# Patient Record
Sex: MALE | Race: BLACK OR AFRICAN AMERICAN | Employment: UNEMPLOYED | URBAN - METROPOLITAN AREA
[De-identification: names, ages, dates, MRNs, and addresses within clinical notes are randomized per-mention and may not be internally consistent; named-entity substitution may affect disease eponyms.]

---

## 2017-01-01 ENCOUNTER — TELEPHONE (OUTPATIENT)
Dept: NEUROLOGY | Age: 62
End: 2017-01-01

## 2017-01-01 ENCOUNTER — DOCUMENTATION ONLY (OUTPATIENT)
Dept: FAMILY MEDICINE CLINIC | Age: 62
End: 2017-01-01

## 2017-01-01 ENCOUNTER — OFFICE VISIT (OUTPATIENT)
Dept: FAMILY MEDICINE CLINIC | Age: 62
End: 2017-01-01

## 2017-01-01 ENCOUNTER — OFFICE VISIT (OUTPATIENT)
Dept: NEUROLOGY | Age: 62
End: 2017-01-01

## 2017-01-01 ENCOUNTER — HOSPICE ADMISSION (OUTPATIENT)
Dept: HOSPICE | Facility: HOSPICE | Age: 62
End: 2017-01-01

## 2017-01-01 VITALS
BODY MASS INDEX: 28.88 KG/M2 | OXYGEN SATURATION: 98 % | HEART RATE: 71 BPM | WEIGHT: 184 LBS | DIASTOLIC BLOOD PRESSURE: 85 MMHG | SYSTOLIC BLOOD PRESSURE: 123 MMHG | TEMPERATURE: 98.2 F | RESPIRATION RATE: 18 BRPM | HEIGHT: 67 IN

## 2017-01-01 VITALS
RESPIRATION RATE: 18 BRPM | HEIGHT: 67 IN | HEART RATE: 67 BPM | OXYGEN SATURATION: 100 % | DIASTOLIC BLOOD PRESSURE: 76 MMHG | SYSTOLIC BLOOD PRESSURE: 104 MMHG | TEMPERATURE: 97.6 F

## 2017-01-01 VITALS
DIASTOLIC BLOOD PRESSURE: 67 MMHG | RESPIRATION RATE: 18 BRPM | HEIGHT: 67 IN | OXYGEN SATURATION: 96 % | HEART RATE: 54 BPM | SYSTOLIC BLOOD PRESSURE: 106 MMHG

## 2017-01-01 VITALS
SYSTOLIC BLOOD PRESSURE: 118 MMHG | HEART RATE: 73 BPM | RESPIRATION RATE: 16 BRPM | OXYGEN SATURATION: 94 % | DIASTOLIC BLOOD PRESSURE: 74 MMHG

## 2017-01-01 VITALS
SYSTOLIC BLOOD PRESSURE: 110 MMHG | HEIGHT: 67 IN | DIASTOLIC BLOOD PRESSURE: 68 MMHG | HEART RATE: 76 BPM | OXYGEN SATURATION: 94 %

## 2017-01-01 VITALS — HEIGHT: 67 IN | DIASTOLIC BLOOD PRESSURE: 80 MMHG | SYSTOLIC BLOOD PRESSURE: 118 MMHG

## 2017-01-01 DIAGNOSIS — G40.211 PARTIAL SYMPTOMATIC EPILEPSY WITH COMPLEX PARTIAL SEIZURES, INTRACTABLE, WITH STATUS EPILEPTICUS (HCC): ICD-10-CM

## 2017-01-01 DIAGNOSIS — R13.10 DYSPHAGIA, UNSPECIFIED TYPE: ICD-10-CM

## 2017-01-01 DIAGNOSIS — R27.8 ASTERIXIS: ICD-10-CM

## 2017-01-01 DIAGNOSIS — F79 MR (MENTAL RETARDATION): Chronic | ICD-10-CM

## 2017-01-01 DIAGNOSIS — R56.9 CONVULSIONS, UNSPECIFIED CONVULSION TYPE (HCC): Chronic | ICD-10-CM

## 2017-01-01 DIAGNOSIS — Z93.1 STATUS POST INSERTION OF PERCUTANEOUS ENDOSCOPIC GASTROSTOMY (PEG) TUBE (HCC): ICD-10-CM

## 2017-01-01 DIAGNOSIS — R56.9 SEIZURE (HCC): Primary | ICD-10-CM

## 2017-01-01 DIAGNOSIS — G40.019 LOCALIZATION-RELATED IDIOPATHIC EPILEPSY AND EPILEPTIC SYNDROMES WITH SEIZURES OF LOCALIZED ONSET, INTRACTABLE, WITHOUT STATUS EPILEPTICUS (HCC): Primary | ICD-10-CM

## 2017-01-01 DIAGNOSIS — R60.9 EDEMA, UNSPECIFIED TYPE: Primary | ICD-10-CM

## 2017-01-01 DIAGNOSIS — J20.9 ACUTE BRONCHITIS, UNSPECIFIED ORGANISM: Primary | ICD-10-CM

## 2017-01-01 DIAGNOSIS — R04.0 EPISTAXIS: ICD-10-CM

## 2017-01-01 DIAGNOSIS — K59.00 CONSTIPATION, UNSPECIFIED CONSTIPATION TYPE: ICD-10-CM

## 2017-01-01 DIAGNOSIS — R26.2 CANNOT WALK: ICD-10-CM

## 2017-01-01 DIAGNOSIS — R05.9 COUGH: Primary | ICD-10-CM

## 2017-01-01 DIAGNOSIS — R13.10 DYSPHAGIA, UNSPECIFIED TYPE: Primary | ICD-10-CM

## 2017-01-01 LAB
ALBUMIN SERPL-MCNC: 3.4 G/DL (ref 3.6–4.8)
ALBUMIN/GLOB SERPL: 0.7 {RATIO} (ref 1.2–2.2)
ALP SERPL-CCNC: 174 IU/L (ref 39–117)
ALT SERPL-CCNC: 199 IU/L (ref 0–44)
AMMONIA PLAS-MCNC: 98 UG/DL (ref 27–102)
AST SERPL-CCNC: 109 IU/L (ref 0–40)
BILIRUB SERPL-MCNC: <0.2 MG/DL (ref 0–1.2)
BUN SERPL-MCNC: 38 MG/DL (ref 8–27)
BUN/CREAT SERPL: 24 (ref 10–22)
CALCIUM SERPL-MCNC: 9.1 MG/DL (ref 8.6–10.2)
CHLORIDE SERPL-SCNC: 120 MMOL/L (ref 96–106)
CO2 SERPL-SCNC: 26 MMOL/L (ref 18–29)
CREAT SERPL-MCNC: 1.6 MG/DL (ref 0.76–1.27)
GLOBULIN SER CALC-MCNC: 4.8 G/DL (ref 1.5–4.5)
GLUCOSE SERPL-MCNC: 76 MG/DL (ref 65–99)
POTASSIUM SERPL-SCNC: 4 MMOL/L (ref 3.5–5.2)
PROT SERPL-MCNC: 8.2 G/DL (ref 6–8.5)
SODIUM SERPL-SCNC: 159 MMOL/L (ref 134–144)

## 2017-01-01 RX ORDER — AMLODIPINE BESYLATE 10 MG/1
TABLET ORAL DAILY
COMMUNITY

## 2017-01-01 RX ORDER — AZITHROMYCIN 250 MG/1
TABLET, FILM COATED ORAL
Qty: 6 TAB | Refills: 0 | Status: SHIPPED | OUTPATIENT
Start: 2017-01-01 | End: 2017-01-01

## 2017-01-01 RX ORDER — BENZTROPINE MESYLATE 1 MG/1
1 TABLET ORAL
Qty: 30 TAB | Refills: 11 | Status: SHIPPED | OUTPATIENT
Start: 2017-01-01

## 2017-01-01 RX ORDER — MUPIROCIN 20 MG/G
OINTMENT TOPICAL DAILY
COMMUNITY

## 2017-01-01 RX ORDER — BENZTROPINE MESYLATE 1 MG/1
1 TABLET ORAL 2 TIMES DAILY
Qty: 60 TAB | Refills: 5 | Status: SHIPPED | OUTPATIENT
Start: 2017-01-01 | End: 2017-01-01 | Stop reason: SDUPTHER

## 2017-01-01 RX ORDER — METHYLPREDNISOLONE 4 MG/1
TABLET ORAL
Qty: 1 DOSE PACK | Refills: 0 | Status: SHIPPED | OUTPATIENT
Start: 2017-01-01 | End: 2017-01-01

## 2017-01-01 RX ORDER — LEVETIRACETAM 100 MG/ML
10 SOLUTION ORAL 2 TIMES DAILY
COMMUNITY
End: 2017-01-01

## 2017-01-01 RX ORDER — BENZONATATE 200 MG/1
200 CAPSULE ORAL
COMMUNITY

## 2017-01-01 RX ORDER — RISPERIDONE 1 MG/1
0.5 TABLET, FILM COATED ORAL 2 TIMES DAILY
Qty: 60 TAB | Refills: 5 | Status: SHIPPED | OUTPATIENT
Start: 2017-01-01

## 2017-01-01 RX ORDER — BISACODYL 5 MG
5 TABLET, DELAYED RELEASE (ENTERIC COATED) ORAL
Qty: 31 TAB | Refills: 5 | Status: SHIPPED | OUTPATIENT
Start: 2017-01-01

## 2017-01-01 RX ORDER — PSYLLIUM HUSK 3.4 G/7G
POWDER, FOR SOLUTION ORAL
Qty: 368 G | Refills: 0 | Status: SHIPPED | OUTPATIENT
Start: 2017-01-01

## 2017-01-01 RX ORDER — GUAIFENESIN 200 MG/10ML
SOLUTION ORAL
Qty: 240 ML | Refills: 0 | Status: SHIPPED | OUTPATIENT
Start: 2017-01-01

## 2017-01-01 RX ORDER — LORAZEPAM 0.5 MG/1
0.5 TABLET ORAL
Qty: 60 TAB | Refills: 5 | OUTPATIENT
Start: 2017-01-01

## 2017-01-01 RX ORDER — LEVETIRACETAM 1000 MG/1
TABLET ORAL
Qty: 90 TAB | Refills: 5 | Status: SHIPPED | OUTPATIENT
Start: 2017-01-01

## 2017-01-11 NOTE — PATIENT INSTRUCTIONS
Learning About Swallowing Problems  What are swallowing problems? Certain health problems that affect the nervous system can cause trouble swallowing. These conditions include stroke, ALS (also known as Lacie Gehrig's disease), Parkinson's disease, and multiple sclerosis. The muscles and nerves that help move food through the throat and esophagus may not work right. Growths, such as cancer, and other problems with your esophagus can also make it hard to swallow. The esophagus is the tube that leads from your throat to your stomach. How are swallowing problems diagnosed? A doctor or speech therapist will examine you to check for swallowing problems. You may get swallowing tests to check how well your throat muscles work. For these tests, you swallow a special liquid that helps the doctor see your throat and esophagus on an X-ray or video screen. Other tests use a thin, flexible tube called a scope to check for problems with your esophagus. The doctor puts the scope in your mouth and down your throat to look at your esophagus. What are the symptoms? Symptoms of swallowing problems may include:  · Trouble getting food or liquids to go down on the first try. · Gagging, choking, or coughing when you swallow. · Having food or liquids come back up through your throat, mouth, or nose after you swallow. · Feeling like foods or liquids are stuck in some part of your throat or chest.  · Pain when you swallow. How are swallowing problems treated? How swallowing problems are treated depends on the cause. The main goals of treatment will be to help you eat and swallow safely and get good nutrition. This is important for your health and quality of life. You may learn exercises to train your throat muscles to work together so you're able to swallow better. Learning certain ways to put food in your mouth or to position your head while eating may also help.   Your doctor or a speech therapist may recommend changes to your diet to help make it easier to swallow. You may need to avoid certain foods or liquids. You also may need to change the thickness of foods or liquids in your diet. To eat and swallow safely, follow any instructions you get from your doctor or therapist. These ideas may help:  · Sit upright when eating, drinking, and taking pills. · Take small bites of food. Chew completely and swallow before taking another bite. · Take small sips of liquids. Hold the liquid in your mouth as you prepare to swallow. · If eating makes you tired, eat smaller but more frequent meals. · If you cough or choke, lean forward and keep your chin tipped downward while you cough. Where can you learn more? Go to http://daniel-annita.info/. Enter 002 [id number] in the search box to learn more about \"Learning About Swallowing Problems. \"  Current as of: May 27, 2016  Content Version: 11.1  © 2981-8220 [employer]. Care instructions adapted under license by [employer] (which disclaims liability or warranty for this information). If you have questions about a medical condition or this instruction, always ask your healthcare professional. [place] [address] any warranty or liability for your use of this information.

## 2017-01-11 NOTE — PROGRESS NOTES
Pt here with staff for hosp f/u.  Pt was in [facility]  12/25-1/10 for UTI  Pt had peg tube placed while in hosp, would like order to resume normal PO intake

## 2017-01-11 NOTE — MR AVS SNAPSHOT
Visit Information Date & Time Provider Department Dept. Phone Encounter #  
 1/11/2017  9:45 AM Lloyd Baird, [address] [phone] [id number] Follow-up Instructions Return in about 1 month (around 2/11/2017), or if symptoms worsen or fail to improve, for BP check. Your Appointments 2/16/2017  3:00 PM  
Follow Up with Mimi Cummings NP [address] [facility] ([facility]) Appt Note: 2 mo seizure f/u. ..lj; 2 mo seizure f/u. ..[address] [place] [address] [address]  
[facility] [address] [address] Upcoming Health Maintenance Date Due Hepatitis C Screening 1955 Pneumococcal 19-64 Medium Risk (1 of 1 - PPSV23) 7/24/1972 FOOT EXAM Q1 4/28/2015 MICROALBUMIN Q1 11/24/2015 FOBT Q 1 YEAR AGE 50-75 11/24/2015 LIPID PANEL Q1 3/4/2016 INFLUENZA AGE 9 TO ADULT 8/1/2016 HEMOGLOBIN A1C Q6M 11/10/2016 EYE EXAM RETINAL OR DILATED Q1 8/31/2017 DTaP/Tdap/Td series (2 - Td) 11/24/2024 Allergies as of 1/11/2017  Review Complete On: 1/11/2017 By: Lloyd Baird, DO No Known Allergies Current Immunizations  Reviewed on 7/20/2016 Name Date PPD 11/13/2012 TB Skin Test (PPD) Intradermal 2/25/2014 Tdap 11/24/2014 Zoster Vaccine, Live 12/18/2014 Not reviewed this visit You Were Diagnosed With   
  
 Codes Comments Dysphagia, unspecified type    -  Primary ICD-10-CM: R13.10 ICD-9-CM: 787.20 Constipation, unspecified constipation type     ICD-10-CM: K59.00 ICD-9-CM: 564.00 Vitals BP Pulse Temp Resp Height(growth percentile) SpO2  
 104/76 67 97.6 °F (36.4 °C) (Axillary) 18 5' 7\" (1.702 m) 100% Smoking Status Never Smoker Vitals History Preferred Pharmacy Pharmacy Name Phone Estefany Nugent [phone] Your Updated Medication List  
  
   
This list is accurate as of: 1/11/17 10:57 AM.  Always use your most recent med list.  
  
  
  
  
 acetaminophen 325 mg tablet Commonly known as:  TYLENOL  
TAKE (1-2) TABLET BY MOUTH EVERY FOUR HOURS AS NEEDED for pain/fever  
  
 aspirin delayed-release 81 mg tablet TAKE 1 TABLET BY MOUTH DAILY  
  
 benztropine 1 mg tablet Commonly known as:  COGENTIN Take  by mouth two (2) times a day. bisacodyl 5 mg EC tablet Commonly known as:  DULCOLAX Take 1 Tab by mouth daily as needed for Constipation. * eslicarbazepine 380 mg Tab Commonly known as:  APTIOM Take 1 Tab by mouth daily. X 2 weeks. Can be crushed * eslicarbazepine 691 mg Tab Commonly known as:  APTIOM Take 1 Tab by mouth daily. X 2 weeks. Can be crushed * eslicarbazepine 362 mg Tab Commonly known as:  APTIOM Take 1 Tab by mouth daily. Can be crushed. Fiber Powd Generic drug:  psyllium seed-dextrose DISSOLVE 1 TEASPOONFUL IN 8 OUNCES OF BEVERAGE & DRINK ONCE DAILY IN THE EVENING  
  
 FIBER SMOOTH Powd Generic drug:  psyllium DISSOLVE 1 TEASPOONFUL IN 8 OUNCES OF BEVERAGE & DRINK ONCE DAILY IN THE EVENING  
  
 FLOMAX 0.4 mg capsule Generic drug:  tamsulosin TAKE 1 CAPSULE BY MOUTH AT BEDTIME  
  
 guaiFENesin 100 mg/5 mL liquid Commonly known as:  ROBAFEN  
TAKE 2 TEASPOONFUL (10ml) THREE TIMES DAILY AS NEEDED FOR COUGH OR CONGESTION  
  
 levETIRAcetam 1,000 mg tablet Commonly known as:  KEPPRA TAKE 1.5 tablets by mouth in the morning and 1.5 tablets by mouth at night LORazepam 0.5 mg tablet Commonly known as:  ATIVAN Take 1 tablet by mouth every eight (8) hours as needed for Anxiety. LOTRIMIN AF 2 % Spra Generic drug:  miconazole nitrate  
by Apply Externally route. mupirocin 2 % ointment Commonly known as:  [employer] Apply  to affected area daily. NATURAL FIBER LAXATIVE THERAPY Powd Generic drug:  psyllium seed-sucrose DISSOLVE 1 TEASPOONFUL IN 8 OUNCES OF BEVERAGE & DRINK ONCE DAILY IN THE EVENING  
  
 * OTHER Wheelchair, dx: F79, I69.359, R56.9  
  
 * OTHER Pt needs bed railing PriLOSEC 20 mg capsule Generic drug:  omeprazole Take 20 mg by mouth daily. RisperDAL 1 mg tablet Generic drug:  risperiDONE Take 0.5 mg by mouth daily. Saccharomyces boulardii 250 mg capsule Commonly known as:  Manymoon Take 1 Cap by mouth daily. * Notice: This list has 5 medication(s) that are the same as other medications prescribed for you. Read the directions carefully, and ask your doctor or other care provider to review them with you. Prescriptions Sent to Pharmacy Refills  
 bisacodyl (DULCOLAX) 5 mg EC tablet 5 Sig: Take 1 Tab by mouth daily as needed for Constipation. Class: Normal  
 Pharmacy: [address] [place] [address] #: [phone] Route: Oral  
  
We Performed the Following REFERRAL TO GASTROENTEROLOGY [VAF25 Custom] Comments:  
 Please evaluate patient for dysphagia. [address] [place] [address] Comments:  
 Please evaluate patient for ST. Follow-up Instructions Return in about 1 month (around 2/11/2017), or if symptoms worsen or fail to improve, for BP check. Referral Information Referral ID Referred By Referred To  
  
 [id number] Judith ROBLERO Not Available Visits Status Start Date End Date Estefany Davies 1154 Request 1/11/17 1/11/18 If your referral has a status of pending review or denied, additional information will be sent to support the outcome of this decision. Referral ID Referred By Referred To  
 [id number] [facility] [address] [place] [address] Phone: [phone] Fax: [phone] Visits Status Start Date End Date 1 New Request 1/11/17 1/11/18 If your referral has a status of pending review or denied, additional information will be sent to support the outcome of this decision. Patient Instructions Learning About Swallowing Problems What are swallowing problems? Certain health problems that affect the nervous system can cause trouble swallowing. These conditions include stroke, ALS (also known as Lacie Gehrig's disease), Parkinson's disease, and multiple sclerosis. The muscles and nerves that help move food through the throat and esophagus may not work right. Growths, such as cancer, and other problems with your esophagus can also make it hard to swallow. The esophagus is the tube that leads from your throat to your stomach. How are swallowing problems diagnosed? A doctor or speech therapist will examine you to check for swallowing problems. You may get swallowing tests to check how well your throat muscles work. For these tests, you swallow a special liquid that helps the doctor see your throat and esophagus on an X-ray or video screen. Other tests use a thin, flexible tube called a scope to check for problems with your esophagus. The doctor puts the scope in your mouth and down your throat to look at your esophagus. What are the symptoms? Symptoms of swallowing problems may include: · Trouble getting food or liquids to go down on the first try. · Gagging, choking, or coughing when you swallow. · Having food or liquids come back up through your throat, mouth, or nose after you swallow. · Feeling like foods or liquids are stuck in some part of your throat or chest. 
· Pain when you swallow. How are swallowing problems treated? How swallowing problems are treated depends on the cause. The main goals of treatment will be to help you eat and swallow safely and get good nutrition. This is important for your health and quality of life.  
You may learn exercises to train your throat muscles to work together so you're able to swallow better. Learning certain ways to put food in your mouth or to position your head while eating may also help. Your doctor or a speech therapist may recommend changes to your diet to help make it easier to swallow. You may need to avoid certain foods or liquids. You also may need to change the thickness of foods or liquids in your diet. To eat and swallow safely, follow any instructions you get from your doctor or therapist. These ideas may help: 
· Sit upright when eating, drinking, and taking pills. · Take small bites of food. Chew completely and swallow before taking another bite. · Take small sips of liquids. Hold the liquid in your mouth as you prepare to swallow. · If eating makes you tired, eat smaller but more frequent meals. · If you cough or choke, lean forward and keep your chin tipped downward while you cough. Where can you learn more? Go to http://daniel-annita.info/. Enter [id number] in the search box to learn more about \"Learning About Swallowing Problems. \" Current as of: May 27, 2016 Content Version: 11.1 © 8393-5201 [employer]. Care instructions adapted under license by [employer] (which disclaims liability or warranty for this information). If you have questions about a medical condition or this instruction, always ask your healthcare professional. [place] [address] any warranty or liability for your use of this information. Introducing [facility]! Dear Gabrielle Caballero: Thank you for requesting a ProBinder account. Our records indicate that you have previously registered for a ProBinder account but its currently inactive. Please call our ProBinder support line at [phone]. Additional Information If you have questions, please visit the Frequently Asked Questions section of the ProBinder website at [email or web address] Remember, MyChart is NOT to be used for urgent needs. For medical emergencies, dial 911. Now available from your iPhone and Android! Please provide this summary of care documentation to your next provider. Your primary care clinician is listed as Tesuque Pickett. If you have any questions after today's visit, please call [phone].

## 2017-01-11 NOTE — PROGRESS NOTES
Marsha Maurice is a 59 y.o. male   Chief Complaint   Patient presents with   [facility] Follow Up    pt here for f/u from hospital at [facility] was admitted for pos uti, pt does have a chronic catheter. Per aide pt was given a feeding tube because they were nervous about feeding him. Pt was not having any issues with aspiration. According to hosp ppwk pt was possibly aspirating. Pt has now been off of the htn meds and BP is fine will remain off. Chief Complaint   Patient presents with   [facility] Follow Up     he is a 59y.o. year old male who presents for evalution. Reviewed PmHx, RxHx, FmHx, SocHx, AllgHx and updated and dated in the chart. Review of Systems - negative except as listed above in the HPI    Objective:     Vitals:    01/11/17 1013   BP: 104/76   Pulse: 67   Resp: 18   Temp: 97.6 °F (36.4 °C)   TempSrc: Axillary   SpO2: 100%   Height: 5' 7\" (1.702 m)       Current Outpatient Prescriptions   Medication Sig    mupirocin (BACTROBAN) 2 % ointment Apply  to affected area daily.  bisacodyl (DULCOLAX) 5 mg EC tablet Take 1 Tab by mouth daily as needed for Constipation.  levETIRAcetam (KEPPRA) 1,000 mg tablet TAKE 1.5 tablets by mouth in the morning and 1.5 tablets by mouth at night    aspirin delayed-release 81 mg tablet TAKE 1 TABLET BY MOUTH DAILY    NATURAL FIBER LAXATIVE THERAPY powd DISSOLVE 1 TEASPOONFUL IN 8 OUNCES OF BEVERAGE & DRINK ONCE DAILY IN THE EVENING    guaiFENesin (ROBAFEN) 100 mg/5 mL liquid TAKE 2 TEASPOONFUL (10ml) THREE TIMES DAILY AS NEEDED FOR COUGH OR CONGESTION    OTHER Pt needs bed railing    OTHER Wheelchair, dx: F79, I69.359, R56.9    acetaminophen (TYLENOL) 325 mg tablet TAKE (1-2) TABLET BY MOUTH EVERY FOUR HOURS AS NEEDED for pain/fever    benztropine (COGENTIN) 1 mg tablet Take  by mouth two (2) times a day.  LORazepam (ATIVAN) 0.5 mg tablet Take 1 tablet by mouth every eight (8) hours as needed for Anxiety.     omeprazole (PRILOSEC) 20 mg capsule Take 20 mg by mouth daily.  eslicarbazepine (APTIOM) 400 mg tab Take 1 Tab by mouth daily. X 2 weeks. Can be crushed    eslicarbazepine (APTIOM) 600 mg tab Take 1 Tab by mouth daily. X 2 weeks. Can be crushed    eslicarbazepine (APTIOM) 800 mg tab Take 1 Tab by mouth daily. Can be crushed.  miconazole nitrate (LOTRIMIN AF) 2 % spra by Apply Externally route.  Saccharomyces boulardii (FLORASTOR) 250 mg capsule Take 1 Cap by mouth daily.  risperiDONE (RISPERDAL) 1 mg tablet Take 0.5 mg by mouth daily.  FIBER powd DISSOLVE 1 TEASPOONFUL IN 8 OUNCES OF BEVERAGE & DRINK ONCE DAILY IN THE EVENING    FLOMAX 0.4 mg capsule TAKE 1 CAPSULE BY MOUTH AT BEDTIME    FIBER SMOOTH Powd DISSOLVE 1 TEASPOONFUL IN 8 OUNCES OF BEVERAGE & DRINK ONCE DAILY IN THE EVENING     No current facility-administered medications for this visit. Physical Examination: General appearance - alert, well appearing, and in no distress  Mental status - baseline  Eyes - pupils equal and reactive, extraocular eye movements intact  Chest - clear to auscultation, no wheezes, rales or rhonchi, symmetric air entry  Heart - normal rate, regular rhythm, normal S1, S2, no murmurs, rubs, clicks or gallops  Abdomen - soft, nontender, nondistended, no masses or organomegaly  Peg tube in place      Assessment/ Plan:   Maricruz Hidalgo was seen today for hospital follow up. Diagnoses and all orders for this visit:    Dysphagia, unspecified type  -     [address]  Will need to be cleared by GI before PO feeds  Constipation, unspecified constipation type  -     bisacodyl (DULCOLAX) 5 mg EC tablet; Take 1 Tab by mouth daily as needed for Constipation. f/u 1 month BP check, c/w d/c bp meds  Follow-up Disposition:  Return in about 1 month (around 2/11/2017), or if symptoms worsen or fail to improve, for BP check.     I have discussed the diagnosis with the patient and the intended plan as seen in the above orders. The patient has received an after-visit summary and questions were answered concerning future plans. Pt conveyed understanding of plan.     Medication Side Effects and Warnings were discussed with patient      Dannielle Cooks, DO

## 2017-01-19 NOTE — PROGRESS NOTES
Patient present for a follow up. Accompanied with caregiver. Caregiver reports was hospitalized at [facility] from 12/25/16 to 01/11/17 due to having Bronchitis and UTI. Had multiple seizures in the hospital, wasn't eating. Hospital placed a PEG tube. No recurrent seizure activity since hospital visit.    Has restarted the 400 mg dose since being out of the hospital.

## 2017-01-19 NOTE — PROGRESS NOTES
Francisca Pacheco is a 59 y.o. male who presents with the following  Chief Complaint   Patient presents with    Seizure     follow up       HPI Patient comes in for a follow up for partial epilepsy. Was hospitalized at [facility] in December and into January 11th for UTI and bronchitis. Caregiver states he had multiple seizures while there. Also had a peg tube placed because he was not eating very well. They switched Keppra to liquid but aides are not able to give to him through the tube so want to switch back to tablets. In the hospital she states he had multiple seizures. Same type he has always been having. Just more frequent. He since discharge has not had 1 seizure and is doing well. Was on Aptiom 400 mg before getting into hospital. Never got up to the 800 mg dose where we wanted due to getting hospitalized. He is currently taking Keppra 1500 mg BID and Aptiom 400 mg daily for seizure prevention. Back to baseline mental status per caregiver. He is sleeping and eating well. Getting along with other residents. No Known Allergies    Current Outpatient Prescriptions   Medication Sig    benzonatate (TESSALON) 200 mg capsule Take 200 mg by mouth three (3) times daily as needed for Cough.  levETIRAcetam (KEPPRA) 100 mg/mL solution Take 10 mg/kg by mouth two (2) times a day.  levETIRAcetam (KEPPRA) 1,000 mg tablet TAKE 1.5 tablets by mouth in the morning and 1.5 tablets by mouth at night    eslicarbazepine (APTIOM) 800 mg tab Take 1 Tab by mouth daily. Can be crushed.  mupirocin (BACTROBAN) 2 % ointment Apply  to affected area daily.  bisacodyl (DULCOLAX) 5 mg EC tablet Take 1 Tab by mouth daily as needed for Constipation.  eslicarbazepine (APTIOM) 400 mg tab Take 1 Tab by mouth daily. X 2 weeks.  Can be crushed    aspirin delayed-release 81 mg tablet TAKE 1 TABLET BY MOUTH DAILY    guaiFENesin (ROBAFEN) 100 mg/5 mL liquid TAKE 2 TEASPOONFUL (10ml) THREE TIMES DAILY AS NEEDED FOR COUGH OR CONGESTION    acetaminophen (TYLENOL) 325 mg tablet TAKE (1-2) TABLET BY MOUTH EVERY FOUR HOURS AS NEEDED for pain/fever    risperiDONE (RISPERDAL) 1 mg tablet Take 0.5 mg by mouth two (2) times a day.  benztropine (COGENTIN) 1 mg tablet Take  by mouth two (2) times a day.  LORazepam (ATIVAN) 0.5 mg tablet Take 1 tablet by mouth every eight (8) hours as needed for Anxiety.  FIBER powd DISSOLVE 1 TEASPOONFUL IN 8 OUNCES OF BEVERAGE & DRINK ONCE DAILY IN THE EVENING    FIBER SMOOTH Powd DISSOLVE 1 TEASPOONFUL IN 8 OUNCES OF BEVERAGE & DRINK ONCE DAILY IN THE EVENING    omeprazole (PRILOSEC) 20 mg capsule Take 20 mg by mouth daily.  eslicarbazepine (APTIOM) 600 mg tab Take 1 Tab by mouth daily. X 2 weeks. Can be crushed    miconazole nitrate (LOTRIMIN AF) 2 % spra by Apply Externally route.  NATURAL FIBER LAXATIVE THERAPY powd DISSOLVE 1 TEASPOONFUL IN 8 OUNCES OF BEVERAGE & DRINK ONCE DAILY IN THE EVENING    OTHER Pt needs bed railing    OTHER Wheelchair, dx: F79, I69.359, R56.9    Saccharomyces boulardii (FLORASTOR) 250 mg capsule Take 1 Cap by mouth daily.  FLOMAX 0.4 mg capsule TAKE 1 CAPSULE BY MOUTH AT BEDTIME     No current facility-administered medications for this visit. History   Smoking Status    Never Smoker   Smokeless Tobacco    Never Used       Past Medical History   Diagnosis Date    BPH (benign prostatic hyperplasia) 4/8/2010    DM (diabetes mellitus) ([facility] 4/8/2010    HTN (hypertension) 4/8/2010    MR (mental retardation) 4/8/2010    Seizure ([facility] 4/8/2010       Past Surgical History   Procedure Laterality Date    Hc bag colostomy 4 2pc ster         No family history on file.     Social History     Social History    Marital status: SINGLE     Spouse name: N/A    Number of children: N/A    Years of education: N/A     Social History Main Topics    Smoking status: Never Smoker    Smokeless tobacco: Never Used    Alcohol use No    Drug use: No    Sexual activity: Not Asked     Other Topics Concern    None     Social History Narrative       Review of Systems   HENT: Negative for hearing loss and tinnitus. Respiratory: Negative for cough, shortness of breath and wheezing. Cardiovascular: Negative for chest pain. Gastrointestinal: Negative for nausea and vomiting. Musculoskeletal: Negative for falls. Neurological: Positive for seizures, loss of consciousness and weakness. Negative for headaches. Psychiatric/Behavioral: The patient does not have insomnia. Remainder of comprehensive review is negative. Physical Exam :  General: Well defined, nourished, and groomed individual in no acute distress.    Neck: Supple, nontender, no bruits, no pain with resistance to active range of motion.    Heart: Regular rate and rhythm, no murmurs, rub, or gallop. Normal S1S2. Lungs: Clear to auscultation bilaterally with equal chest expansion, no cough, no wheeze  Musculoskeletal: Extremities revealed no edema. Bilateral EU contracted in at elbow. Psych: nonverbal      NEUROLOGICAL EXAMINATION:    Mental Status: nonverbal      Cranial Nerves:    II, III, IV, VI: Visual acuity grossly intact. Visual fields are normal.  partially tracks   Pupil on LEFT equal, round, and reactive to light and accommodation. Right eye cataract.    Extra-ocular movements are full and fluid. Fundoscopic exam was benign, no ptosis or nystagmus.    V-XII: Hearing is grossly intact.     Motor Examination: Normal tone, bulk. LUE contracted in      Coordination: not assessed      Gait and Station: wheelchair not assessed      Reflexes: DTRs 3+ throughout.               Visit Vitals    /74    Pulse 73    Resp 16    SpO2 94%         Orders Placed This Encounter    benzonatate (TESSALON) 200 mg capsule     Sig: Take 200 mg by mouth three (3) times daily as needed for Cough.  levETIRAcetam (KEPPRA) 100 mg/mL solution     Sig: Take 10 mg/kg by mouth two (2) times a day.     levETIRAcetam (KEPPRA) 1,000 mg tablet     Sig: TAKE 1.5 tablets by mouth in the morning and 1.5 tablets by mouth at night     Dispense:  90 Tab     Refill:  5    eslicarbazepine (APTIOM) 800 mg tab     Sig: Take 1 Tab by mouth daily. Can be crushed. Dispense:  30 Tab     Refill:  5       1. Convulsions, unspecified convulsion type ([facility]    2. Partial symptomatic epilepsy with complex partial seizures, intractable, with status epilepticus ([facility]        Follow-up Disposition:  Return in about 2 months (around 3/19/2017). Partial epilepsy. Continue Keppra 1500 mg BID and switch back to pills as they are having trouble giving liquid due to not being able to use the PEG tube. They have been having trouble getting him to take this orally. We will increase to Aptiom 600 mg X 2 weeks then to 800 mg and stay here at this dose for further prevention of seizures. Caretaker understands and has no questions. We would expect seizures to increase with hospitalization and sickness and now they are back to being normalized. Continue to monitor for seizures and let us know if anything changes. Get notes from [facility].          Burton Lopez NP        This note will not be viewable in [address]

## 2017-01-19 NOTE — MR AVS SNAPSHOT
Visit Information Date & Time Provider Department Dept. Phone Encounter #  
 1/19/2017  2:30 PM Vivian Krause NP [address] [facility] [phone] [id number] Follow-up Instructions Return in about 2 months (around 3/19/2017). Your Appointments 2/15/2017  2:45 PM  
ESTABLISHED PATIENT with Akilah Vicente,  [address] ([address]) Appt Note: check tubing; r/s same [address] [address] [address]  
[phone]  
  
   
 [address] [address] Upcoming Health Maintenance Date Due Hepatitis C Screening 1955 Pneumococcal 19-64 Medium Risk (1 of 1 - PPSV23) 7/24/1972 FOOT EXAM Q1 4/28/2015 MICROALBUMIN Q1 11/24/2015 FOBT Q 1 YEAR AGE 50-75 11/24/2015 LIPID PANEL Q1 3/4/2016 INFLUENZA AGE 9 TO ADULT 8/1/2016 HEMOGLOBIN A1C Q6M 11/10/2016 EYE EXAM RETINAL OR DILATED Q1 8/31/2017 DTaP/Tdap/Td series (2 - Td) 11/24/2024 Allergies as of 1/19/2017  Review Complete On: 1/19/2017 By: Cait Sánchez LPN No Known Allergies Current Immunizations  Reviewed on 7/20/2016 Name Date PPD 11/13/2012 TB Skin Test (PPD) Intradermal 2/25/2014 Tdap 11/24/2014 Zoster Vaccine, Live 12/18/2014 Not reviewed this visit You Were Diagnosed With   
  
 Codes Comments Convulsions, unspecified convulsion type ([facility]     ICD-10-CM: R56.9 ICD-9-CM: 780.39 Partial symptomatic epilepsy with complex partial seizures, intractable, with status epilepticus ([facility]     ICD-10-CM: N20.125 ICD-9-CM: 345.41, 345.3 Vitals BP Pulse Resp SpO2 Smoking Status 118/74 73 16 94% Never Smoker Preferred Pharmacy Pharmacy Name Phone [facility], [phone] Your Updated Medication List  
  
   
This list is accurate as of: 1/19/17  3:13 PM.  Always use your most recent med list.  
  
  
  
  
 acetaminophen 325 mg tablet Commonly known as:  TYLENOL  
TAKE (1-2) TABLET BY MOUTH EVERY FOUR HOURS AS NEEDED for pain/fever  
  
 aspirin delayed-release 81 mg tablet TAKE 1 TABLET BY MOUTH DAILY  
  
 benzonatate 200 mg capsule Commonly known as:  TESSALON Take 200 mg by mouth three (3) times daily as needed for Cough. benztropine 1 mg tablet Commonly known as:  COGENTIN Take  by mouth two (2) times a day. bisacodyl 5 mg EC tablet Commonly known as:  DULCOLAX Take 1 Tab by mouth daily as needed for Constipation. * eslicarbazepine 332 mg Tab Commonly known as:  APTIOM Take 1 Tab by mouth daily. X 2 weeks. Can be crushed * eslicarbazepine 919 mg Tab Commonly known as:  APTIOM Take 1 Tab by mouth daily. X 2 weeks. Can be crushed * eslicarbazepine 591 mg Tab Commonly known as:  APTIOM Take 1 Tab by mouth daily. Can be crushed. Fiber Powd Generic drug:  psyllium seed-dextrose DISSOLVE 1 TEASPOONFUL IN 8 OUNCES OF BEVERAGE & DRINK ONCE DAILY IN THE EVENING  
  
 FIBER SMOOTH Powd Generic drug:  psyllium DISSOLVE 1 TEASPOONFUL IN 8 OUNCES OF BEVERAGE & DRINK ONCE DAILY IN THE EVENING  
  
 FLOMAX 0.4 mg capsule Generic drug:  tamsulosin TAKE 1 CAPSULE BY MOUTH AT BEDTIME  
  
 guaiFENesin 100 mg/5 mL liquid Commonly known as:  ROBAFEN  
TAKE 2 TEASPOONFUL (10ml) THREE TIMES DAILY AS NEEDED FOR COUGH OR CONGESTION  
  
 * KEPPRA 100 mg/mL solution Generic drug:  levETIRAcetam  
Take 10 mg/kg by mouth two (2) times a day. * levETIRAcetam 1,000 mg tablet Commonly known as:  KEPPRA TAKE 1.5 tablets by mouth in the morning and 1.5 tablets by mouth at night LORazepam 0.5 mg tablet Commonly known as:  ATIVAN Take 1 tablet by mouth every eight (8) hours as needed for Anxiety. LOTRIMIN AF 2 % Spra Generic drug:  miconazole nitrate  
by Apply Externally route. mupirocin 2 % ointment Commonly known as:  [facility] Apply  to affected area daily. NATURAL FIBER LAXATIVE THERAPY Powd Generic drug:  psyllium seed-sucrose DISSOLVE 1 TEASPOONFUL IN 8 OUNCES OF BEVERAGE & DRINK ONCE DAILY IN THE EVENING  
  
 * OTHER Wheelchair, dx: F79, I69.359, R56.9  
  
 * OTHER Pt needs bed railing PriLOSEC 20 mg capsule Generic drug:  omeprazole Take 20 mg by mouth daily. RisperDAL 1 mg tablet Generic drug:  risperiDONE Take 0.5 mg by mouth two (2) times a day. Saccharomyces boulardii 250 mg capsule Commonly known as:  Fyber Take 1 Cap by mouth daily. * Notice: This list has 7 medication(s) that are the same as other medications prescribed for you. Read the directions carefully, and ask your doctor or other care provider to review them with you. Prescriptions Sent to Pharmacy Refills  
 levETIRAcetam (KEPPRA) 1,000 mg tablet 5 Sig: TAKE 1.5 tablets by mouth in the morning and 1.5 tablets by mouth at night Class: Normal  
 Pharmacy: [address] [place] [address] Ph #: [phone]  
 eslicarbazepine (APTIOM) 800 mg tab 5 Sig: Take 1 Tab by mouth daily. Can be crushed. Class: Normal  
 Pharmacy: [address] [place] [address] Ph #: [phone] Route: Oral  
  
Follow-up Instructions Return in about 2 months (around 3/19/2017). Patient Instructions PRESCRIPTION REFILL POLICY [facility] Statement to Patients April 1, 2014 In an effort to ensure the large volume of patient prescription refills is processed in the most efficient and expeditious manner, we are asking our patients to assist us by calling your Pharmacy for all prescription refills, this will include also your  Mail Order Pharmacy. The pharmacy will contact our office electronically to continue the refill process. Please do not wait until the last minute to call your pharmacy. We need at least 48 hours (2days) to fill prescriptions. We also encourage you to call your pharmacy before going to  your prescription to make sure it is ready. With regard to controlled substance prescription refill requests (narcotic refills) that need to be picked up at our office, we ask your cooperation by providing us with at least 72 hours (3days) notice that you will need a refill. We will not refill narcotic prescription refill requests after 4:00pm on any weekday, Monday through Thursday, or after 2:00pm on Fridays, or on the weekends. We encourage everyone to explore another way of getting your prescription refill request processed using engageSimply, our patient web portal through our electronic medical record system. engageSimply is an efficient and effective way to communicate your medication request directly to the office and  downloadable as an jelani on your smart phone . engageSimply also features a review functionality that allows you to view your medication list as well as leave messages for your physician. Are you ready to get connected? If so please review the attatched instructions or speak to any of our staff to get you set up right away! Thank you so much for your cooperation. Should you have any questions please contact our Practice Administrator. The Physicians and Staff,  [facility] Thank you for choosing [facility] and [facility] for your  
 
care. You may receive a survey about your visit. We appreciate you taking time  
 
to complete this survey as we use your feedback to improve our services. We  
 
realize we are not perfect, but we strive to provide excellent care. Estefany Winters [address] What is a living will?  
A living will is a legal form you use to write down the kind of care you want at the end of your life. It is used by the health professionals who will treat you if you aren't able to decide for yourself. If you put your wishes in writing, your loved ones and others will know what kind of care you want. They won't need to guess. This can ease your mind and be helpful to others. A living will is not the same as an estate or property will. An estate will explains what you want to happen with your money and property after you die. Is a living will a legal document? A living will is a legal document. Each state has its own laws about living patten. If you move to another state, make sure that your living will is legal in the state where you now live. Or you might use a universal form that has been approved by many states. This kind of form can sometimes be completed and stored online. Your electronic copy will then be available wherever you have a connection to the Internet. In most cases, doctors will respect your wishes even if you have a form from a different state. · You don't need an  to complete a living will. But legal advice can be helpful if your state's laws are unclear, your health history is complicated, or your family can't agree on what should be in your living will. · You can change your living will at any time. Some people find that their wishes about end-of-life care change as their health changes. · In addition to making a living will, think about completing a medical power of  form. This form lets you name the person you want to make end-of-life treatment decisions for you (your \"health care agent\") if you're not able to. Many hospitals and nursing homes will give you the forms you need to complete a living will and a medical power of . · Your living will is used only if you can't make or communicate decisions for yourself anymore.  If you become able to make decisions again, you can accept or refuse any treatment, no matter what you wrote in your living will. · Your state may offer an online registry. This is a place where you can store your living will online so the doctors and nurses who need to treat you can find it right away. What should you think about when creating a living will? Talk about your end-of-life wishes with your family members and your doctor. Let them know what you want. That way the people making decisions for you won't be surprised by your choices. Think about these questions as you make your living will: · Do you know enough about life support methods that might be used? If not, talk to your doctor so you know what might be done if you can't breathe on your own, your heart stops, or you're unable to swallow. · What things would you still want to be able to do after you receive life-support methods? Would you want to be able to walk? To speak? To eat on your own? To live without the help of machines? · If you have a choice, where do you want to be cared for? In your home? At a hospital or nursing home? · Do you want certain Muslim practices performed if you become very ill? · If you have a choice at the end of your life, where would you prefer to die? At home? In a hospital or nursing home? Somewhere else? · Would you prefer to be buried or cremated? · Do you want your organs to be donated after you die? What should you do with your living will? · Make sure that your family members and your health care agent have copies of your living will. · Give your doctor a copy of your living will to keep in your medical record. If you have more than one doctor, make sure that each one has a copy. · You may want to put a copy of your living will where it can be easily found. Where can you learn more? Go to http://daniel-annita.info/. Enter A603 in the search box to learn more about \"Learning About Living Perroy. \" 
 Current as of: February 24, 2016 Content Version: 11.1 © 2848-6697 [employer]. Care instructions adapted under license by [employer] (which disclaims liability or warranty for this information). If you have questions about a medical condition or this instruction, always ask your healthcare professional. [address] any warranty or liability for your use of this information. After finishing 400 mg Aptiom Start 600 mg Aptiom daily X 2 weeks Then start 800 mg daily Aptiom and stay at this dose. Introducing [facility] [place] [facility]! Dear Jovanny Akins: Thank you for requesting a [employer] account. Our records indicate that you have previously registered for a [employer] account but its currently inactive. Please call our [employer] support line at [phone]. Additional Information If you have questions, please visit the Frequently Asked Questions section of the [employer] website at [email or web address] Remember, [employer] Wonder is NOT to be used for urgent needs. For medical emergencies, dial 911. Now available from your iPhone and Android! Please provide this summary of care documentation to your next provider. Your primary care clinician is listed as Lawrence Cade. If you have any questions after today's visit, please call [phone].

## 2017-01-20 NOTE — TELEPHONE ENCOUNTER
Alfredo likes to have call back from you please regarding to theelectronic rx please. They need mor clarification (APTIOM) please.  Thank you #[phone] and the ext is [phone]

## 2017-01-20 NOTE — TELEPHONE ENCOUNTER
TC to [address] [place] [address] Road ext.[phone]. Spoke with pharmacist Harrison. She stated they received a prescription for Aptiom 800 mg but the patient is currently taking 600 mg. I acknowledged understanding and informed her the NP saw the patient yesterday and was aware the patient was finishing his Aptiom 400 mg prescription yesterday and would be starting the Aptiom 600 mg today, he wants him to take the Aptiom 600 mg for 2 weeks and then start the 800 mg afterwards and stay on that dose. He sent the 800 mg prescription to the pharmacy yesterday because the caregiver informed him the pharmacy already had the 600 mg prescription. She verbalized understanding.

## 2017-02-15 NOTE — MR AVS SNAPSHOT
Visit Information Date & Time Provider Department Dept. Phone Encounter #  
 2/15/2017  2:45 PM Yanique VallesSydney3 [address] Nadine Wolfe [phone] [id number] Follow-up Instructions Return if symptoms worsen or fail to improve. Your Appointments 3/16/2017  2:30 PM  
Follow Up with Valerie Payton NP [address] [facility] ([facility]) Appt Note: 2 mo seizure f/u. ..[address] [facility],[address] [place] [phone] [address]  
[facility] [address] [address] Upcoming Health Maintenance Date Due Hepatitis C Screening 1955 Pneumococcal 19-64 Medium Risk (1 of 1 - PPSV23) 7/24/1972 FOOT EXAM Q1 4/28/2015 MICROALBUMIN Q1 11/24/2015 FOBT Q 1 YEAR AGE 50-75 11/24/2015 LIPID PANEL Q1 3/4/2016 INFLUENZA AGE 9 TO ADULT 8/1/2016 HEMOGLOBIN A1C Q6M 11/10/2016 EYE EXAM RETINAL OR DILATED Q1 8/31/2017 DTaP/Tdap/Td series (2 - Td) 11/24/2024 Allergies as of 2/15/2017  Review Complete On: 2/15/2017 By: Yanique Valles, DO No Known Allergies Current Immunizations  Reviewed on 7/20/2016 Name Date PPD 11/13/2012 TB Skin Test (PPD) Intradermal 2/25/2014 Tdap 11/24/2014 Zoster Vaccine, Live 12/18/2014 Not reviewed this visit You Were Diagnosed With   
  
 Codes Comments MR (mental retardation)    -  Primary ICD-10-CM: F79 
ICD-9-CM: 636 Cannot walk     ICD-10-CM: R26.2 ICD-9-CM: 719.7 Status post [facility]     ICD-10-CM: Z93.1 ICD-9-CM: V44.1 Edema, unspecified type     ICD-10-CM: R60.9 ICD-9-CM: 283. 3 Vitals BP Height(growth percentile) Smoking Status 118/80 5' 7\" (1.702 m) Never Smoker Vitals History Preferred Pharmacy Pharmacy Name Phone [facility] [address], [facility] [address] [phone] Your Updated Medication List  
  
   
This list is accurate as of: 2/15/17  4:07 PM.  Always use your most recent med list.  
  
  
  
  
 acetaminophen 325 mg tablet Commonly known as:  TYLENOL  
TAKE (1-2) TABLET BY MOUTH EVERY FOUR HOURS AS NEEDED for pain/fever  
  
 aspirin delayed-release 81 mg tablet TAKE 1 TABLET BY MOUTH DAILY  
  
 benzonatate 200 mg capsule Commonly known as:  TESSALON Take 200 mg by mouth three (3) times daily as needed for Cough. benztropine 1 mg tablet Commonly known as:  COGENTIN Take  by mouth two (2) times a day. bisacodyl 5 mg EC tablet Commonly known as:  DULCOLAX Take 1 Tab by mouth daily as needed for Constipation. * eslicarbazepine 042 mg Tab Commonly known as:  APTIOM Take 1 Tab by mouth daily. X 2 weeks. Can be crushed * eslicarbazepine 932 mg Tab Commonly known as:  APTIOM Take 1 Tab by mouth daily. Can be crushed. Fiber Powd Generic drug:  psyllium seed-dextrose DISSOLVE 1 TEASPOONFUL IN 8 OUNCES OF BEVERAGE & DRINK ONCE DAILY IN THE EVENING  
  
 FIBER SMOOTH Powd Generic drug:  psyllium DISSOLVE 1 TEASPOONFUL IN 8 OUNCES OF BEVERAGE & DRINK ONCE DAILY IN THE EVENING  
  
 guaiFENesin 100 mg/5 mL liquid Commonly known as:  ROBAFEN  
TAKE 2 TEASPOONFUL (10ml) THREE TIMES DAILY AS NEEDED FOR COUGH OR CONGESTION  
  
 levETIRAcetam 1,000 mg tablet Commonly known as:  KEPPRA TAKE 1.5 tablets by mouth in the morning and 1.5 tablets by mouth at night LORazepam 0.5 mg tablet Commonly known as:  ATIVAN Take 1 Tab by mouth every eight (8) hours as needed for Anxiety. LOTRIMIN AF 2 % Spra Generic drug:  miconazole nitrate  
by Apply Externally route. mupirocin 2 % ointment Commonly known as:  [facility] Apply  to affected area daily. NATURAL FIBER LAXATIVE THERAPY Powd Generic drug:  psyllium seed-sucrose DISSOLVE 1 TEASPOONFUL IN 8 OUNCES OF BEVERAGE & DRINK ONCE DAILY IN THE EVENING  
  
 * OTHER Wheelchair, dx: F79, I69.359, R56.9  
  
 * OTHER Pt needs bed railing PriLOSEC 20 mg capsule Generic drug:  omeprazole Take 20 mg by mouth daily. RisperDAL 1 mg tablet Generic drug:  risperiDONE Take 0.5 mg by mouth two (2) times a day. [address] [facility] Pt needs a new wheelchair current one in disrepair R26.2 * Notice: This list has 4 medication(s) that are the same as other medications prescribed for you. Read the directions carefully, and ask your doctor or other care provider to review them with you. Prescriptions Printed Refills Wheel Chair meron 0 Sig: Pt needs a new wheelchair current one in disrepair R26.2 Class: Print We Performed the Following PRO-BNP [id number] CPT(R)] Follow-up Instructions Return if symptoms worsen or fail to improve. Introducing [facility] SERVICES! Dear Maryanne Gaucher: Thank you for requesting a wishkicker account. Our records indicate that you have previously registered for a wishkicker account but its currently inactive. Please call our wishkicker support line at [phone]. Additional Information If you have questions, please visit the Frequently Asked Questions section of the wishkicker website at [email or web address] Remember, wishkicker is NOT to be used for urgent needs. For medical emergencies, dial 911. Now available from your iPhone and Android! Please provide this summary of care documentation to your next provider. Your primary care clinician is listed as Shellie Traylor. If you have any questions after today's visit, please call [phone].

## 2017-02-15 NOTE — PROGRESS NOTES
Wesly Mcmahon is a 59 y.o. male No chief complaint on file. pt here with aide who states patient has had some slight swelling in his R hand and in his face some around the R eye. Pt was released from hospital last month and did have an IV in R hand. Aide states that his hand does look less swollen. Pt aide also request Rx for a hospital bed and is having a difficult time getting him seated up when getting feedings thru his tube. No chief complaint on file. he is a 59y.o. year old male who presents for evalution. Reviewed PmHx, RxHx, FmHx, SocHx, AllgHx and updated and dated in the chart. Review of Systems - negative except as listed above in the HPI    Objective:     Vitals:    02/15/17 1531   BP: 118/80   Height: 5' 7\" (1.702 m)       Current Outpatient Prescriptions   Medication Sig    Wheel Chair meron Pt needs a new wheelchair current one in disrepair R26.2    LORazepam (ATIVAN) 0.5 mg tablet Take 1 Tab by mouth every eight (8) hours as needed for Anxiety.  benzonatate (TESSALON) 200 mg capsule Take 200 mg by mouth three (3) times daily as needed for Cough.  levETIRAcetam (KEPPRA) 1,000 mg tablet TAKE 1.5 tablets by mouth in the morning and 1.5 tablets by mouth at night    eslicarbazepine (APTIOM) 800 mg tab Take 1 Tab by mouth daily. Can be crushed.  eslicarbazepine (APTIOM) 400 mg tab Take 1 Tab by mouth daily. X 2 weeks. Can be crushed    acetaminophen (TYLENOL) 325 mg tablet TAKE (1-2) TABLET BY MOUTH EVERY FOUR HOURS AS NEEDED for pain/fever    risperiDONE (RISPERDAL) 1 mg tablet Take 0.5 mg by mouth two (2) times a day.  benztropine (COGENTIN) 1 mg tablet Take  by mouth two (2) times a day.  FIBER SMOOTH Powd DISSOLVE 1 TEASPOONFUL IN 8 OUNCES OF BEVERAGE & DRINK ONCE DAILY IN THE EVENING    mupirocin (BACTROBAN) 2 % ointment Apply  to affected area daily.  bisacodyl (DULCOLAX) 5 mg EC tablet Take 1 Tab by mouth daily as needed for Constipation.     miconazole nitrate (LOTRIMIN AF) 2 % spra by Apply Externally route.  aspirin delayed-release 81 mg tablet TAKE 1 TABLET BY MOUTH DAILY    NATURAL FIBER LAXATIVE THERAPY powd DISSOLVE 1 TEASPOONFUL IN 8 OUNCES OF BEVERAGE & DRINK ONCE DAILY IN THE EVENING    guaiFENesin (ROBAFEN) 100 mg/5 mL liquid TAKE 2 TEASPOONFUL (10ml) THREE TIMES DAILY AS NEEDED FOR COUGH OR CONGESTION    OTHER Pt needs bed railing    OTHER Wheelchair, dx: F79, I69.359, R56.9    FIBER powd DISSOLVE 1 TEASPOONFUL IN 8 OUNCES OF BEVERAGE & DRINK ONCE DAILY IN THE EVENING    omeprazole (PRILOSEC) 20 mg capsule Take 20 mg by mouth daily. No current facility-administered medications for this visit. Physical Examination: General appearance - alert, well appearing, and in no distress  Eyes - pupils equal and reactive, extraocular eye movements intact  Mouth - mucous membranes moist, pharynx normal without lesions and no edema  Chest - clear to auscultation, no wheezes, rales or rhonchi, symmetric air entry  Heart - normal rate, regular rhythm, normal S1, S2, no murmurs, rubs, clicks or gallops  Extremities - peripheral pulses normal, no pedal edema, no clubbing or cyanosis, no hand edema  Skin - normal coloration and turgor, no rashes, no suspicious skin lesions noted      Assessment/ Plan:   Diagnoses and all orders for this visit:    Edema, unspecified type  No edema no further work up needed    MR (mental retardation)    Cannot walk  -     Wheel Chair meron; Pt needs a new wheelchair current one in disrepair R26.2    Status post insertion of percutaneous endoscopic gastrostomy (PEG) tube ([facility]     Pt given Rx for hospital bed  Follow-up Disposition:  Return if symptoms worsen or fail to improve. I have discussed the diagnosis with the patient and the intended plan as seen in the above orders. The patient has received an after-visit summary and questions were answered concerning future plans.  Pt conveyed understanding of plan.    Medication Side Effects and Warnings were discussed with patient      Shavonne Zazueta DO

## 2017-02-17 NOTE — PROGRESS NOTES
Nathanael Montilla Guardianship Specialist with [employer] has questions about peg tub and that patient is also eating orally. Wants to know if a barium swallow has been done. Letter of guardianship over medical conditions has been scanned into chart. She can be reached at [phone] or [phone].

## 2017-02-17 NOTE — PROGRESS NOTES
Discused with guardian pt needs GI f/u to discuss removal of peg and PO feedings and this was discussed at visit on 1/11/17

## 2017-03-15 NOTE — PROGRESS NOTES
Chief Complaint   Patient presents with    Cough     Caregiver reports cough x 1 week usually occurs @ hs.

## 2017-03-15 NOTE — PATIENT INSTRUCTIONS
Bronchitis: Care Instructions  Your Care Instructions    Bronchitis is inflammation of the bronchial tubes, which carry air to the lungs. The tubes swell and produce mucus, or phlegm. The mucus and inflamed bronchial tubes make you cough. You may have trouble breathing. Most cases of bronchitis are caused by viruses like those that cause colds. Antibiotics usually do not help and they may be harmful. Bronchitis usually develops rapidly and lasts about 2 to 3 weeks in otherwise healthy people. Follow-up care is a key part of your treatment and safety. Be sure to make and go to all appointments, and call your doctor if you are having problems. It's also a good idea to know your test results and keep a list of the medicines you take. How can you care for yourself at home? · Take all medicines exactly as prescribed. Call your doctor if you think you are having a problem with your medicine. · Get some extra rest.  · Take an over-the-counter pain medicine, such as acetaminophen (Tylenol), ibuprofen (Advil, Motrin), or naproxen (Aleve) to reduce fever and relieve body aches. Read and follow all instructions on the label. · Do not take two or more pain medicines at the same time unless the doctor told you to. Many pain medicines have acetaminophen, which is Tylenol. Too much acetaminophen (Tylenol) can be harmful. · Take an over-the-counter cough medicine that contains dextromethorphan to help quiet a dry, hacking cough so that you can sleep. Avoid cough medicines that have more than one active ingredient. Read and follow all instructions on the label. · Breathe moist air from a humidifier, hot shower, or sink filled with hot water. The heat and moisture will thin mucus so you can cough it out. · Do not smoke. Smoking can make bronchitis worse. If you need help quitting, talk to your doctor about stop-smoking programs and medicines. These can increase your chances of quitting for good.   When should you call for help? Call 911 anytime you think you may need emergency care. For example, call if:  · You have severe trouble breathing. Call your doctor now or seek immediate medical care if:  · You have new or worse trouble breathing. · You cough up dark brown or bloody mucus (sputum). · You have a new or higher fever. · You have a new rash. Watch closely for changes in your health, and be sure to contact your doctor if:  · You cough more deeply or more often, especially if you notice more mucus or a change in the color of your mucus. · You are not getting better as expected. Where can you learn more? Go to http://daniel-annita.info/. Enter H333 in the search box to learn more about \"Bronchitis: Care Instructions. \"  Current as of: May 23, 2016  Content Version: 11.1  © 6803-6866 [employer]. Care instructions adapted under license by [employer] (which disclaims liability or warranty for this information). If you have questions about a medical condition or this instruction, always ask your healthcare professional. [address] any warranty or liability for your use of this information.

## 2017-03-15 NOTE — PROGRESS NOTES
Oriana Sibley is a 59 y.o. male   Chief Complaint   Patient presents with    Cough    pt here with aide states pt has had a cough for past week and acting more tired. No fever no chills that they have noticed. No runny nose. No SOB. he is a 59y.o. year old male who presents for evalution. Reviewed PmHx, RxHx, FmHx, SocHx, AllgHx and updated and dated in the chart. Review of Systems - negative except as listed above in the HPI    Objective:     Vitals:    03/15/17 1551   BP: 106/67   Pulse: (!) 54   Resp: 18   SpO2: 96%   Height: 5' 7\" (1.702 m)       Current Outpatient Prescriptions   Medication Sig    PSEUDOEPHEDRINE-dextromethorphan-guaiFENesin (ROBAFEN CF) 30- mg/5 mL solution Take 10 mL by mouth Before breakfast, lunch, and dinner.  amLODIPine (NORVASC) 10 mg tablet Take  by mouth daily.  azithromycin (ZITHROMAX) 250 mg tablet Take 2 tablets today, then take 1 tablet daily    methylPREDNISolone (MEDROL DOSEPACK) 4 mg tablet Take as directed    NATURAL FIBER LAXATIVE THERAPY powd DISSOLVE 1 TEASPOONFUL IN 8 OUNCES OF BEVERAGE & DRINK ONCE DAILY IN THE EVENING    benztropine (COGENTIN) 1 mg tablet Take 1 Tab by mouth nightly. Please cancel prior Rx    risperiDONE (RISPERDAL) 1 mg tablet Take 0.5 Tabs by mouth two (2) times a day.  Wheel Chair meron Pt needs a new wheelchair current one in disrepair R26.2    LORazepam (ATIVAN) 0.5 mg tablet Take 1 Tab by mouth every eight (8) hours as needed for Anxiety.  benzonatate (TESSALON) 200 mg capsule Take 200 mg by mouth three (3) times daily as needed for Cough.  levETIRAcetam (KEPPRA) 1,000 mg tablet TAKE 1.5 tablets by mouth in the morning and 1.5 tablets by mouth at night (Patient taking differently: Take 15 ml via peg tube  bid)    eslicarbazepine (APTIOM) 800 mg tab Take 1 Tab by mouth daily. Can be crushed.  mupirocin (BACTROBAN) 2 % ointment Apply  to affected area daily.     bisacodyl (DULCOLAX) 5 mg EC tablet Take 1 Tab by mouth daily as needed for Constipation.  miconazole nitrate (LOTRIMIN AF) 2 % spra by Apply Externally route.  aspirin delayed-release 81 mg tablet TAKE 1 TABLET BY MOUTH DAILY    guaiFENesin (ROBAFEN) 100 mg/5 mL liquid TAKE 2 TEASPOONFUL (10ml) THREE TIMES DAILY AS NEEDED FOR COUGH OR CONGESTION    OTHER Pt needs bed railing    OTHER Wheelchair, dx: F79, I69.359, R56.9    acetaminophen (TYLENOL) 325 mg tablet TAKE (1-2) TABLET BY MOUTH EVERY FOUR HOURS AS NEEDED for pain/fever    FIBER powd DISSOLVE 1 TEASPOONFUL IN 8 OUNCES OF BEVERAGE & DRINK ONCE DAILY IN THE EVENING    FIBER SMOOTH Powd DISSOLVE 1 TEASPOONFUL IN 8 OUNCES OF BEVERAGE & DRINK ONCE DAILY IN THE EVENING    omeprazole (PRILOSEC) 20 mg capsule Take 20 mg by mouth daily.  eslicarbazepine (APTIOM) 400 mg tab Take 1 Tab by mouth daily. X 2 weeks. Can be crushed     No current facility-administered medications for this visit. Physical Examination: General appearance - alert, well appearing, and in no distress  Eyes - pupils equal and reactive, extraocular eye movements intact  Ears - bilateral TM's and external ear canals normal  Mouth - mucous membranes moist, pharynx normal without lesions  Neck - supple, no significant adenopathy  Chest - coarse b/l but no rhonchi  Heart - normal rate, regular rhythm, normal S1, S2, no murmurs, rubs, clicks or gallops      Assessment/ Plan:   Remedios Burt was seen today for cough. Diagnoses and all orders for this visit:    Acute bronchitis, unspecified organism  -     azithromycin (ZITHROMAX) 250 mg tablet; Take 2 tablets today, then take 1 tablet daily  -     methylPREDNISolone (MEDROL DOSEPACK) 4 mg tablet; Take as directed     discussed warning signs and when ED would be appropriate  Follow-up Disposition:  Return if symptoms worsen or fail to improve. I have discussed the diagnosis with the patient and the intended plan as seen in the above orders.   The patient has received an after-visit summary and questions were answered concerning future plans. Pt conveyed understanding of plan.     Medication Side Effects and Warnings were discussed with patient      Cecelia Aldridge DO

## 2017-03-15 NOTE — MR AVS SNAPSHOT
Visit Information Date & Time Provider Department Dept. Phone Encounter #  
 3/15/2017  3:45 PM Micah Jose, [place] Nadine Wolfe [phone] [id number] Follow-up Instructions Return if symptoms worsen or fail to improve. Your Appointments 3/16/2017  2:30 PM  
Follow Up with Brittanie Hein NP [address] [facility] ([facility]) Appt Note: 2 mo seizure f/u. ..[address] [facility],[address] [place] [address] [address]  
[facility] [address] [address] Upcoming Health Maintenance Date Due Hepatitis C Screening 1955 Pneumococcal 19-64 Medium Risk (1 of 1 - PPSV23) 7/24/1972 FOOT EXAM Q1 4/28/2015 MICROALBUMIN Q1 11/24/2015 FOBT Q 1 YEAR AGE 50-75 11/24/2015 LIPID PANEL Q1 3/4/2016 INFLUENZA AGE 9 TO ADULT 8/1/2016 HEMOGLOBIN A1C Q6M 11/10/2016 EYE EXAM RETINAL OR DILATED Q1 8/31/2017 DTaP/Tdap/Td series (2 - Td) 11/24/2024 Allergies as of 3/15/2017  Review Complete On: 3/15/2017 By: Sarah Marroquin LPN No Known Allergies Current Immunizations  Reviewed on 7/20/2016 Name Date PPD 11/13/2012 TB Skin Test (PPD) Intradermal 2/25/2014 Tdap 11/24/2014 Zoster Vaccine, Live 12/18/2014 Not reviewed this visit You Were Diagnosed With   
  
 Codes Comments Acute bronchitis, unspecified organism    -  Primary ICD-10-CM: J20.9 ICD-9-CM: 466.0 Vitals BP Pulse Resp Height(growth percentile) SpO2 Smoking Status 106/67 (BP 1 Location: Left arm, BP Patient Position: Sitting) (!) 54 18 5' 7\" (1.702 m) 96% Never Smoker Vitals History Preferred Pharmacy Pharmacy Name Phone Estef[facility] Sunshine, [address] [phone] Your Updated Medication List  
  
   
This list is accurate as of: 3/15/17  4:18 PM.  Always use your most recent med list.  
  
  
  
  
 acetaminophen 325 mg tablet Commonly known as:  TYLENOL  
TAKE (1-2) TABLET BY MOUTH EVERY FOUR HOURS AS NEEDED for pain/fever  
  
 aspirin delayed-release 81 mg tablet TAKE 1 TABLET BY MOUTH DAILY  
  
 azithromycin 250 mg tablet Commonly known as:  Elli Krystal Take 2 tablets today, then take 1 tablet daily  
  
 benzonatate 200 mg capsule Commonly known as:  TESSALON Take 200 mg by mouth three (3) times daily as needed for Cough. benztropine 1 mg tablet Commonly known as:  COGENTIN Take 1 Tab by mouth nightly. Please cancel prior Rx  
  
 bisacodyl 5 mg EC tablet Commonly known as:  DULCOLAX Take 1 Tab by mouth daily as needed for Constipation. * eslicarbazepine 275 mg Tab Commonly known as:  APTIOM Take 1 Tab by mouth daily. X 2 weeks. Can be crushed * eslicarbazepine 208 mg Tab Commonly known as:  APTIOM Take 1 Tab by mouth daily. Can be crushed. Fiber Powd Generic drug:  psyllium seed-dextrose DISSOLVE 1 TEASPOONFUL IN 8 OUNCES OF BEVERAGE & DRINK ONCE DAILY IN THE EVENING  
  
 FIBER SMOOTH Powd Generic drug:  psyllium DISSOLVE 1 TEASPOONFUL IN 8 OUNCES OF BEVERAGE & DRINK ONCE DAILY IN THE EVENING  
  
 guaiFENesin 100 mg/5 mL liquid Commonly known as:  ROBAFEN  
TAKE 2 TEASPOONFUL (10ml) THREE TIMES DAILY AS NEEDED FOR COUGH OR CONGESTION  
  
 levETIRAcetam 1,000 mg tablet Commonly known as:  KEPPRA TAKE 1.5 tablets by mouth in the morning and 1.5 tablets by mouth at night LORazepam 0.5 mg tablet Commonly known as:  ATIVAN Take 1 Tab by mouth every eight (8) hours as needed for Anxiety. LOTRIMIN AF 2 % Spra Generic drug:  miconazole nitrate  
by Apply Externally route. methylPREDNISolone 4 mg tablet Commonly known as:  Sandee Galvan Take as directed  
  
 mupirocin 2 % ointment Commonly known as:  [facility] Apply  to affected area daily. NATURAL FIBER LAXATIVE THERAPY Powd Generic drug:  psyllium seed-sucrose DISSOLVE 1 TEASPOONFUL IN 8 OUNCES OF BEVERAGE & DRINK ONCE DAILY IN THE EVENING  
  
 NORVASC 10 mg tablet Generic drug:  amLODIPine Take  by mouth daily. * OTHER Wheelchair, dx: F79, I69.359, R56.9  
  
 * OTHER Pt needs bed railing PriLOSEC 20 mg capsule Generic drug:  omeprazole Take 20 mg by mouth daily. risperiDONE 1 mg tablet Commonly known as:  RisperDAL Take 0.5 Tabs by mouth two (2) times a day. ROBAFEN CF 30- mg/5 mL solution Generic drug:  PSEUDOEPHEDRINE-dextromethorphan-guaiFENesin Take 10 mL by mouth Before breakfast, lunch, and dinner. [facility] Pt needs a new wheelchair current one in disrepair R26.2 * Notice: This list has 4 medication(s) that are the same as other medications prescribed for you. Read the directions carefully, and ask your doctor or other care provider to review them with you. Prescriptions Sent to Pharmacy Refills  
 azithromycin (ZITHROMAX) 250 mg tablet 0 Sig: Take 2 tablets today, then take 1 tablet daily Class: Normal  
 Pharmacy: [address] [place] [address] Ph #: [phone]  
 methylPREDNISolone (MEDROL DOSEPACK) 4 mg tablet 0 Sig: Take as directed Class: Normal  
 Pharmacy: [address] [place] [address] Ph #: [phone] Follow-up Instructions Return if symptoms worsen or fail to improve. Patient Instructions Bronchitis: Care Instructions Your Care Instructions Bronchitis is inflammation of the bronchial tubes, which carry air to the lungs. The tubes swell and produce mucus, or phlegm. The mucus and inflamed bronchial tubes make you cough. You may have trouble breathing. Most cases of bronchitis are caused by viruses like those that cause colds. Antibiotics usually do not help and they may be harmful. Bronchitis usually develops rapidly and lasts about 2 to 3 weeks in otherwise healthy people. Follow-up care is a key part of your treatment and safety. Be sure to make and go to all appointments, and call your doctor if you are having problems. It's also a good idea to know your test results and keep a list of the medicines you take. How can you care for yourself at home? · Take all medicines exactly as prescribed. Call your doctor if you think you are having a problem with your medicine. · Get some extra rest. 
· Take an over-the-counter pain medicine, such as acetaminophen (Tylenol), ibuprofen (Advil, Motrin), or naproxen (Aleve) to reduce fever and relieve body aches. Read and follow all instructions on the label. · Do not take two or more pain medicines at the same time unless the doctor told you to. Many pain medicines have acetaminophen, which is Tylenol. Too much acetaminophen (Tylenol) can be harmful. · Take an over-the-counter cough medicine that contains dextromethorphan to help quiet a dry, hacking cough so that you can sleep. Avoid cough medicines that have more than one active ingredient. Read and follow all instructions on the label. · Breathe moist air from a humidifier, hot shower, or sink filled with hot water. The heat and moisture will thin mucus so you can cough it out. · Do not smoke. Smoking can make bronchitis worse. If you need help quitting, talk to your doctor about stop-smoking programs and medicines. These can increase your chances of quitting for good. When should you call for help? Call 911 anytime you think you may need emergency care. For example, call if: 
· You have severe trouble breathing. Call your doctor now or seek immediate medical care if: 
· You have new or worse trouble breathing. · You cough up dark brown or bloody mucus (sputum). · You have a new or higher fever. · You have a new rash.  
Watch closely for changes in your health, and be sure to contact your doctor if: 
· You cough more deeply or more often, especially if you notice more mucus or a change in the color of your mucus. · You are not getting better as expected. Where can you learn more? Go to http://daniel-annita.info/. Enter H333 in the search box to learn more about \"Bronchitis: Care Instructions. \" Current as of: May 23, 2016 Content Version: 11.1 © 6799-7374 [employer]. Care instructions adapted under license by [employer] (which disclaims liability or warranty for this information). If you have questions about a medical condition or this instruction, always ask your healthcare professional. [address] any warranty or liability for your use of this information. Introducing [facility]! Dear Maryanne Gaucher: Thank you for requesting a [employer] account. Our records indicate that you have previously registered for a [employer] account but its currently inactive. Please call our [employer] support line at [phone]. Additional Information If you have questions, please visit the Frequently Asked Questions section of the [employer] website at [email or web address] Remember, Who[employer] is NOT to be used for urgent needs. For medical emergencies, dial 911. Now available from your iPhone and Android! Please provide this summary of care documentation to your next provider. Your primary care clinician is listed as Shellie Traylor. If you have any questions after today's visit, please call [phone].

## 2017-03-21 NOTE — PROGRESS NOTES
Follow up for seizure activity. Caregiver reports seizure activity since last visit. Last seizure was a week ago. Caregiver reports patient's right hand jerking for past month.

## 2017-03-21 NOTE — PATIENT INSTRUCTIONS
Information Regarding Testing     If you have physican order for a test or a medication denied by your insurance company, this does not mean the test or medication is not appropriate for you as that is a medical decision, not a decision to be made by an insurance company representative or by an [facility] physician who has not interviewed and examined you. This is a decision to be made between you and your physician. The denial of services is a contractual matter between you and your insurance company, not an issue between your physician and the insurance company. If your test or medication is denied, you can take the following steps to help resolve the issue:    1. File a complaint with the [facility] of [facility] regarding your insurance company's denial of services ordered for you. You can do this either by calling them directly or by completing an on-line complaint form on the [employer]. This can be found at www.TripIt    2. Also file a formal complaint with your insurance company and ask to have the name of the person denying the service so that you may explore a legal option should you be harmed by this denial of service. Again, the fact the insurance company will not pay for the service does not mean it is not medically necessary and I would encourage you to follow through with the plan that was made with your physician    3. File a written complaint with your employer so your employer and benefit manager is aware of the poor coverage they are providing their employees. If you have medicare/medicaid, complain to your representative in the House and to your Jose Lopez.         [facility]   Statement to Patients  April 1, 2014      In an effort to ensure the large volume of patient prescription refills is processed in the most efficient and expeditious manner, we are asking our patients to assist us by calling your Pharmacy for all prescription refills, this will include also your  Mail Order Pharmacy. The pharmacy will contact our office electronically to continue the refill process. Please do not wait until the last minute to call your pharmacy. We need at least 48 hours (2days) to fill prescriptions. We also encourage you to call your pharmacy before going to  your prescription to make sure it is ready. With regard to controlled substance prescription refill requests (narcotic refills) that need to be picked up at our office, we ask your cooperation by providing us with at least 72 hours (3days) notice that you will need a refill. We will not refill narcotic prescription refill requests after 4:00pm on any weekday, Monday through Thursday, or after 2:00pm on Fridays, or on the weekends. We encourage everyone to explore another way of getting your prescription refill request processed using [employer], our patient web portal through our electronic medical record system. [employer] is an efficient and effective way to communicate your medication request directly to the office and  downloadable as an jelani on your smart phone . [employer] also features a review functionality that allows you to view your medication list as well as leave messages for your physician. Are you ready to get connected? If so please review the attatched instructions or speak to any of our staff to get you set up right away! Thank you so much for your cooperation. Should you have any questions please contact our Practice Administrator. The Physicians and Staff,  [facility]       If we have ordered testing for you, we do not call patients with results and we do not give test results over the phone. We schedule follow up appointments so that your results can be discussed in person and any questions you have regarding them may be addressed.   If something of concern is revealed on your test, we will call you for a sooner follow up appointment. Additionally, results may be found by using the My Chart feature and one of our patient service representatives at the  can give you instructions on how to access this feature of our electronic medical record system. Learning About Living Stepan  What is a living will? A living will is a legal form you use to write down the kind of care you want at the end of your life. It is used by the health professionals who will treat you if you aren't able to decide for yourself. If you put your wishes in writing, your loved ones and others will know what kind of care you want. They won't need to guess. This can ease your mind and be helpful to others. A living will is not the same as an estate or property will. An estate will explains what you want to happen with your money and property after you die. Is a living will a legal document? A living will is a legal document. Each state has its own laws about living patten. If you move to another state, make sure that your living will is legal in the state where you now live. Or you might use a universal form that has been approved by many states. This kind of form can sometimes be completed and stored online. Your electronic copy will then be available wherever you have a connection to the Internet. In most cases, doctors will respect your wishes even if you have a form from a different state. · You don't need an  to complete a living will. But legal advice can be helpful if your state's laws are unclear, your health history is complicated, or your family can't agree on what should be in your living will. · You can change your living will at any time. Some people find that their wishes about end-of-life care change as their health changes. · In addition to making a living will, think about completing a medical power of  form.  This form lets you name the person you want to make end-of-life treatment decisions for you (your \"health care agent\") if you're not able to. Many hospitals and nursing homes will give you the forms you need to complete a living will and a medical power of . · Your living will is used only if you can't make or communicate decisions for yourself anymore. If you become able to make decisions again, you can accept or refuse any treatment, no matter what you wrote in your living will. · Your state may offer an online registry. This is a place where you can store your living will online so the doctors and nurses who need to treat you can find it right away. What should you think about when creating a living will? Talk about your end-of-life wishes with your family members and your doctor. Let them know what you want. That way the people making decisions for you won't be surprised by your choices. Think about these questions as you make your living will:  · Do you know enough about life support methods that might be used? If not, talk to your doctor so you know what might be done if you can't breathe on your own, your heart stops, or you're unable to swallow. · What things would you still want to be able to do after you receive life-support methods? Would you want to be able to walk? To speak? To eat on your own? To live without the help of machines? · If you have a choice, where do you want to be cared for? In your home? At a hospital or nursing home? · Do you want certain Jewish practices performed if you become very ill? · If you have a choice at the end of your life, where would you prefer to die? At home? In a hospital or nursing home? Somewhere else? · Would you prefer to be buried or cremated? · Do you want your organs to be donated after you die? What should you do with your living will? · Make sure that your family members and your health care agent have copies of your living will. · Give your doctor a copy of your living will to keep in your medical record.  If you have more than one doctor, make sure that each one has a copy. · You may want to put a copy of your living will where it can be easily found. Where can you learn more? Go to http://daniel-annita.info/. Enter T868 in the search box to learn more about \"Learning About Living Danae Alberts. \"  Current as of: February 24, 2016  Content Version: 11.1  © 5461-7452 [employer]. Care instructions adapted under license by [employer] (which disclaims liability or warranty for this information). If you have questions about a medical condition or this instruction, always ask your healthcare professional. [address] any warranty or liability for your use of this information. Have blood sample drawn and if his ammonia level is elevated we will call in a prescription for some lactulose to help bring that down and that should help with the shaking of the arm. If the ammonia level is normal, we will order an EEG looking for any seizure type activity. We will call you after we received the blood results. We will set follow-up depending upon how we decide to go.

## 2017-03-21 NOTE — MR AVS SNAPSHOT
Visit Information Date & Time Provider Department Dept. Phone Encounter #  
 3/21/2017  2:00 PM Olive Carrasco MD [address] [place] [address] Neurology [facility] [phone] [id number] Follow-up Instructions Return for TBD. Upcoming Health Maintenance Date Due Hepatitis C Screening 1955 Pneumococcal 19-64 Medium Risk (1 of 1 - PPSV23) 7/24/1972 FOOT EXAM Q1 4/28/2015 MICROALBUMIN Q1 11/24/2015 FOBT Q 1 YEAR AGE 50-75 11/24/2015 LIPID PANEL Q1 3/4/2016 INFLUENZA AGE 9 TO ADULT 8/1/2016 HEMOGLOBIN A1C Q6M 11/10/2016 EYE EXAM RETINAL OR DILATED Q1 8/31/2017 DTaP/Tdap/Td series (2 - Td) 11/24/2024 Allergies as of 3/21/2017  Review Complete On: 3/21/2017 By: Olive Carrasco MD  
 No Known Allergies Current Immunizations  Reviewed on 7/20/2016 Name Date PPD 11/13/2012 TB Skin Test (PPD) Intradermal 2/25/2014 Tdap 11/24/2014 Zoster Vaccine, Live 12/18/2014 Not reviewed this visit You Were Diagnosed With   
  
 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 3 Vitals BP Pulse Height(growth percentile) SpO2 Smoking Status 110/68 76 5' 7\" (1.702 m) 94% Never Smoker Preferred Pharmacy Pharmacy Name Phone [facility], [address] [phone] Your Updated Medication List  
  
   
This list is accurate as of: 3/21/17  2:27 PM.  Always use your most recent med list.  
  
  
  
  
 acetaminophen 325 mg tablet Commonly known as:  TYLENOL  
TAKE (1-2) TABLET BY MOUTH EVERY FOUR HOURS AS NEEDED for pain/fever  
  
 aspirin delayed-release 81 mg tablet TAKE 1 TABLET BY MOUTH DAILY  
  
 benzonatate 200 mg capsule Commonly known as:  TESSALON Take 200 mg by mouth three (3) times daily as needed for Cough. benztropine 1 mg tablet Commonly known as:  COGENTIN Take 1 Tab by mouth nightly. Please cancel prior Rx  
  
 bisacodyl 5 mg EC tablet Commonly known as:  DULCOLAX Take 1 Tab by mouth daily as needed for Constipation. eslicarbazepine 459 mg Tab Commonly known as:  APTIOM Take 1 Tab by mouth daily. Can be crushed. Fiber Powd Generic drug:  psyllium seed-dextrose DISSOLVE 1 TEASPOONFUL IN 8 OUNCES OF BEVERAGE & DRINK ONCE DAILY IN THE EVENING  
  
 FIBER SMOOTH Powd Generic drug:  psyllium DISSOLVE 1 TEASPOONFUL IN 8 OUNCES OF BEVERAGE & DRINK ONCE DAILY IN THE EVENING  
  
 levETIRAcetam 1,000 mg tablet Commonly known as:  KEPPRA TAKE 1.5 tablets by mouth in the morning and 1.5 tablets by mouth at night LORazepam 0.5 mg tablet Commonly known as:  ATIVAN Take 1 Tab by mouth every eight (8) hours as needed for Anxiety. LOTRIMIN AF 2 % Spra Generic drug:  miconazole nitrate  
by Apply Externally route. mupirocin 2 % ointment Commonly known as:  [employer] Apply  to affected area daily. NATURAL FIBER LAXATIVE THERAPY Powd Generic drug:  psyllium seed-sucrose DISSOLVE 1 TEASPOONFUL IN 8 OUNCES OF BEVERAGE & DRINK ONCE DAILY IN THE EVENING  
  
 NORVASC 10 mg tablet Generic drug:  amLODIPine Take  by mouth daily. * OTHER Wheelchair, dx: F79, I69.359, R56.9  
  
 * OTHER Pt needs bed railing PriLOSEC 20 mg capsule Generic drug:  omeprazole Take 20 mg by mouth daily. risperiDONE 1 mg tablet Commonly known as:  RisperDAL Take 0.5 Tabs by mouth two (2) times a day. ROBAFEN 100 mg/5 mL liquid Generic drug:  guaiFENesin TAKE 10 mL BY MOUTH THREE TIMES DAILY AS NEEDED FOR CONGESTION & COUGH  
  
 ROBAFEN CF 30- mg/5 mL solution Generic drug:  PSEUDOEPHEDRINE-dextromethorphan-guaiFENesin Take 10 mL by mouth Before breakfast, lunch, and dinner. [address] Pt needs a new wheelchair current one in disrepair R26.2 * Notice: This list has 2 medication(s) that are the same as other medications prescribed for you. Read the directions carefully, and ask your doctor or other care provider to review them with you. We Performed the Following CBC WITH AUTOMATED DIFF [75211 CPT(R)] METABOLIC PANEL, COMPREHENSIVE [68218 CPT(R)] Follow-up Instructions Return for TBD. To-Do List   
 03/21/2017 Lab:  [facility] Patient Instructions Information Regarding Testing If you have physican order for a test or a medication denied by your insurance company, this does not mean the test or medication is not appropriate for you as that is a medical decision, not a decision to be made by an insurance company representative or by an [facility] physician who has not interviewed and examined you. This is a decision to be made between you and your physician. The denial of services is a contractual matter between you and your insurance company, not an issue between your physician and the insurance company. If your test or medication is denied, you can take the following steps to help resolve the issue: 1. File a complaint with the [facility] regarding your insurance company's denial of services ordered for you. You can do this either by calling them directly or by completing an on-line complaint form on the [employer]. This can be found at www.[place].gov 2. Also file a formal complaint with your insurance company and ask to have the name of the person denying the service so that you may explore a legal option should you be harmed by this denial of service.   Again, the fact the insurance company will not pay for the service does not mean it is not medically necessary and I would encourage you to follow through with the plan that was made with your physician 3. File a written complaint with your employer so your employer and benefit manager is aware of the poor coverage they are providing their employees. If you have medicare/medicaid, complain to your representative in the House and to your Jose Lopez. PRESCRIPTION REFILL POLICY [facility] Statement to Patients April 1, 2014 In an effort to ensure the large volume of patient prescription refills is processed in the most efficient and expeditious manner, we are asking our patients to assist us by calling your Pharmacy for all prescription refills, this will include also your  Mail Order Pharmacy. The pharmacy will contact our office electronically to continue the refill process. Please do not wait until the last minute to call your pharmacy. We need at least 48 hours (2days) to fill prescriptions. We also encourage you to call your pharmacy before going to  your prescription to make sure it is ready. With regard to controlled substance prescription refill requests (narcotic refills) that need to be picked up at our office, we ask your cooperation by providing us with at least 72 hours (3days) notice that you will need a refill. We will not refill narcotic prescription refill requests after 4:00pm on any weekday, Monday through Thursday, or after 2:00pm on Fridays, or on the weekends. We encourage everyone to explore another way of getting your prescription refill request processed using [employer], our patient web portal through our electronic medical record system. [employer] is an efficient and effective way to communicate your medication request directly to the office and  downloadable as an jelani on your smart phone .  [employer] also features a review functionality that allows you to view your medication list as well as leave messages for your physician. Are you ready to get connected? If so please review the attatched instructions or speak to any of our staff to get you set up right away! Thank you so much for your cooperation. Should you have any questions please contact our Practice Administrator. The Physicians and Staff,  [facility] If we have ordered testing for you, we do not call patients with results and we do not give test results over the phone. We schedule follow up appointments so that your results can be discussed in person and any questions you have regarding them may be addressed. If something of concern is revealed on your test, we will call you for a sooner follow up appointment. Additionally, results may be found by using the My Chart feature and one of our patient service representatives at the  can give you instructions on how to access this feature of our electronic medical record system. Estefany Winters [address] What is a living will? A living will is a legal form you use to write down the kind of care you want at the end of your life. It is used by the health professionals who will treat you if you aren't able to decide for yourself. If you put your wishes in writing, your loved ones and others will know what kind of care you want. They won't need to guess. This can ease your mind and be helpful to others. A living will is not the same as an estate or property will. An estate will explains what you want to happen with your money and property after you die. Is a living will a legal document? A living will is a legal document. Each state has its own laws about living patten. If you move to another state, make sure that your living will is legal in the state where you now live. Or you might use a universal form that has been approved by many states. This kind of form can sometimes be completed and stored online.  Your electronic copy will then be available wherever you have a connection to the Internet. In most cases, doctors will respect your wishes even if you have a form from a different state. · You don't need an  to complete a living will. But legal advice can be helpful if your state's laws are unclear, your health history is complicated, or your family can't agree on what should be in your living will. · You can change your living will at any time. Some people find that their wishes about end-of-life care change as their health changes. · In addition to making a living will, think about completing a medical power of  form. This form lets you name the person you want to make end-of-life treatment decisions for you (your \"health care agent\") if you're not able to. Many hospitals and nursing homes will give you the forms you need to complete a living will and a medical power of . · Your living will is used only if you can't make or communicate decisions for yourself anymore. If you become able to make decisions again, you can accept or refuse any treatment, no matter what you wrote in your living will. · Your state may offer an online registry. This is a place where you can store your living will online so the doctors and nurses who need to treat you can find it right away. What should you think about when creating a living will? Talk about your end-of-life wishes with your family members and your doctor. Let them know what you want. That way the people making decisions for you won't be surprised by your choices. Think about these questions as you make your living will: · Do you know enough about life support methods that might be used? If not, talk to your doctor so you know what might be done if you can't breathe on your own, your heart stops, or you're unable to swallow.  
· What things would you still want to be able to do after you receive life-support methods? Would you want to be able to walk? To speak? To eat on your own? To live without the help of machines? · If you have a choice, where do you want to be cared for? In your home? At a hospital or nursing home? · Do you want certain [employer]ist practices performed if you become very ill? · If you have a choice at the end of your life, where would you prefer to die? At home? In a hospital or nursing home? Somewhere else? · Would you prefer to be buried or cremated? · Do you want your organs to be donated after you die? What should you do with your living will? · Make sure that your family members and your health care agent have copies of your living will. · Give your doctor a copy of your living will to keep in your medical record. If you have more than one doctor, make sure that each one has a copy. · You may want to put a copy of your living will where it can be easily found. Where can you learn more? Go to http://daniel-annita.info/. Enter N471 in the search box to learn more about \"Learning About Living Reji Hoover. \" Current as of: February 24, 2016 Content Version: 11.1 © 1499-6343 [employer]. Care instructions adapted under license by [employer] (which disclaims liability or warranty for this information). If you have questions about a medical condition or this instruction, always ask your healthcare professional. [address] any warranty or liability for your use of this information. Have blood sample drawn and if his ammonia level is elevated we will call in a prescription for some lactulose to help bring that down and that should help with the shaking of the arm. If the ammonia level is normal, we will order an EEG looking for any seizure type activity. We will call you after we received the blood results. We will set follow-up depending upon how we decide to go. Introducing [facility]! Dear Jayden Fall: Thank you for requesting a "[employer] account. Our records indicate that you have previously registered for a "[employer] account but its currently inactive. Please call our "[employer] support line at [phone]. Additional Information If you have questions, please visit the Frequently Asked Questions section of the "[employer] website at [email or web address] Remember, "[employer] is NOT to be used for urgent needs. For medical emergencies, dial 911. Now available from your iPhone and Android! Please provide this summary of care documentation to your next provider. Your primary care clinician is listed as Yanique Valles. If you have any questions after today's visit, please call [phone].

## 2017-03-21 NOTE — TELEPHONE ENCOUNTER
----- Message from Rudy Matthews sent at 3/21/2017  3:56 PM EDT -----  Regarding: Dr. Marcia To  Pt called to leave f [phone] for Dr. Eleanor Moreira to fax seizure protocol too. Best call back [phone].

## 2017-03-21 NOTE — PROGRESS NOTES
[address]   [place] [address] [place] [address]   Yassine Richardson [address]   [address] [facility]    Fax           PT SEEN AT [facility]    Chief Complaint   Patient presents with    Seizure     follow up     Current Outpatient Prescriptions   Medication Sig Dispense Refill    ROBAFEN 100 mg/5 mL liquid TAKE 10 mL BY MOUTH THREE TIMES DAILY AS NEEDED FOR CONGESTION & COUGH 240 mL 0    PSEUDOEPHEDRINE-dextromethorphan-guaiFENesin (ROBAFEN CF) 30- mg/5 mL solution Take 10 mL by mouth Before breakfast, lunch, and dinner.  amLODIPine (NORVASC) 10 mg tablet Take  by mouth daily.  azithromycin (ZITHROMAX) 250 mg tablet Take 2 tablets today, then take 1 tablet daily 6 Tab 0    methylPREDNISolone (MEDROL DOSEPACK) 4 mg tablet Take as directed 1 Dose Pack 0    NATURAL FIBER LAXATIVE THERAPY powd DISSOLVE 1 TEASPOONFUL IN 8 OUNCES OF BEVERAGE & DRINK ONCE DAILY IN THE EVENING 368 g 0    benztropine (COGENTIN) 1 mg tablet Take 1 Tab by mouth nightly. Please cancel prior Rx 30 Tab 11    risperiDONE (RISPERDAL) 1 mg tablet Take 0.5 Tabs by mouth two (2) times a day. 61 Tab 5    Wheel Chair meron Pt needs a new wheelchair current one in disrepair R26.2 1 Each 0    LORazepam (ATIVAN) 0.5 mg tablet Take 1 Tab by mouth every eight (8) hours as needed for Anxiety. 60 Tab 5    benzonatate (TESSALON) 200 mg capsule Take 200 mg by mouth three (3) times daily as needed for Cough.  levETIRAcetam (KEPPRA) 1,000 mg tablet TAKE 1.5 tablets by mouth in the morning and 1.5 tablets by mouth at night (Patient taking differently: Take 15 ml via peg tube  bid) 90 Tab 5    eslicarbazepine (APTIOM) 800 mg tab Take 1 Tab by mouth daily. Can be crushed. 30 Tab 5    mupirocin (BACTROBAN) 2 % ointment Apply  to affected area daily.  bisacodyl (DULCOLAX) 5 mg EC tablet Take 1 Tab by mouth daily as needed for Constipation.  31 Tab 5    eslicarbazepine (APTIOM) 400 mg tab Take 1 Tab by mouth daily. X 2 weeks. Can be crushed 14 Tab 0    miconazole nitrate (LOTRIMIN AF) 2 % spra by Apply Externally route.  aspirin delayed-release 81 mg tablet TAKE 1 TABLET BY MOUTH DAILY 31 Tab PRN    OTHER Pt needs bed railing 2 Each 0    OTHER Wheelchair, dx: F79, I69.359, R56.9 1 Device 0    acetaminophen (TYLENOL) 325 mg tablet TAKE (1-2) TABLET BY MOUTH EVERY FOUR HOURS AS NEEDED for pain/fever 30 Tab 5    FIBER powd DISSOLVE 1 TEASPOONFUL IN 8 OUNCES OF BEVERAGE & DRINK ONCE DAILY IN THE EVENING 368 Bottle 11    FIBER SMOOTH Powd DISSOLVE 1 TEASPOONFUL IN 8 OUNCES OF BEVERAGE & DRINK ONCE DAILY IN THE EVENING 283 g 6    omeprazole (PRILOSEC) 20 mg capsule Take 20 mg by mouth daily. No Known Allergies  Social History   Substance Use Topics    Smoking status: Never Smoker    Smokeless tobacco: Never Used    Alcohol use No     Patient returns today accompanied by caregivers for follow-up epilepsy, presumed left temporal secondary to abnormal EEG with left temporal sharp waves. He saw our nurse practitioner, Fawn Fontenot, last visit and his Keppra was maintained. His Aptiom was increased to a dose of 800 mg daily in a stepwise fashion. Since that appointment he has some seizures although the caregiver who is with him has no idea how many he has had. He notes he had one last week at the day program.  He is taking Keppra 1000 mg tablets 1.5 tablet twice daily and Aptiom 800 mg tablet daily. His caregiver notes that he has been eating well. He takes by mouth but also uses PEG. He has been sleeping well. He has had shaking of his right upper extremity and they noticed it more when he goes to do something. Do not really notice it when he is at rest.  He is also had an upper respiratory infection.     Review of systems  He is unable secondary to static encephalopathy    Examination  Visit Vitals    /68    Pulse 76    Ht 5' 7\" (1.702 m)    SpO2 94%     He appears well cared for. He is in a wheelchair. He is wearing his hat. He is nonverbal.  No icterus. He will track. He will reach out with his right upper extremity when he does so he has negative myoclonus. Impression/Plan  Static encephalopathy with localization-related epilepsy as noted above and only one seizure since his last visit. That is actually good for him. Given that and the fact that he is negative myoclonus i.e. asterixis, we will not make a change to his medication. We will send him for an ammonia level, CMP, and CBC. If the ammonia level is elevated we will use some lactulose. If the ammonia level is not elevated we will get an EEG to exclude any epileptiform/ictal component there. Discussed all this with his caregiver. We will call his group home after we receive the results of the blood work. We therefore will leave follow-up open at this point until we get the results of the blood work and then move forward in terms of our plan. This note was created using voice recognition software. Despite editing, there may be syntax errors. This note will not be viewable in [address]

## 2017-03-21 NOTE — TELEPHONE ENCOUNTER
----- Message from Leyla Rodríguez sent at 3/21/2017  3:56 PM EDT -----  Regarding: Dr. Melvin Call  Pt called to leave f [phone] for Dr. Chica Tsai to fax seizure protocol too. Best call back [phone].

## 2017-03-27 NOTE — TELEPHONE ENCOUNTER
Please call caregiver and let him know patient's labs are abnormal   He has signs of dehydration as well as liver enzyme elevation   These metabolic derangements are likely causing the abnormal movements   Hydrate patient very well daily--extra fluids   Repeat his CMP end of week   pls notify his PCP of abnormal blood work   If repeat CMP is no better in terms of LFT, have pt return for appt to change off Aptiom   Attempted to call caregiver, number not available.

## 2017-03-27 NOTE — PROGRESS NOTES
Pt here with group home counselor to follow up on epistaxis. Counselor states that pt has not had epistaxis since Thursday. Reports that pt has had had a linger cough x several weeks. States that pt has completed [employer], but no improvement. Pt had two episodes of nose bleeding 4 days ago, none since. Pt also needs thickit refill    Subjective: (As above and below)     Chief Complaint   Patient presents with    Epistaxis    Cough     he is a 59y.o. year old male who presents for evaluation. Reviewed PmHx, RxHx, FmHx, SocHx, AllgHx and updated in chart. Review of Systems - negative except as listed above    Objective:     Vitals:    03/27/17 1456   BP: 123/85   Pulse: 71   Resp: 18   Temp: 98.2 °F (36.8 °C)   TempSrc: Axillary   SpO2: 98%   Weight: 184 lb (83.5 kg)   Height: 5' 7\" (1.702 m)     Physical Examination: General appearance - alert, well appearing, and in no distress  Mouth - actively drooling  Chest - difficult exam, pt not able to participate, upper airway congestion noted  Heart - normal rate, regular rhythm, normal S1, S2, no murmurs, rubs, clicks or gallops  Musculoskeletal - no joint tenderness, deformity or swelling  Extremities - in wheelchair    Assessment/ Plan:   1. Cough  -zpak as written  -will get cxr if not improved    2. Epistaxis  -resolved, monitor only    3. Dysphagia, unspecified type  -please use thickit as written     Follow-up Disposition: As needed  I have discussed the diagnosis with the patient and the intended plan as seen in the above orders. The patient has received an after-visit summary and questions were answered concerning future plans.      Medication Side Effects and Warnings were discussed with patient: yes  Patient Labs were reviewed: yes  Patient Past Records were reviewed:  yes    Jeff Fernandez M.D.

## 2017-03-27 NOTE — MR AVS SNAPSHOT
Visit Information Date & Time Provider Department Dept. Phone Encounter #  
 3/27/2017  3:15 PM Roseann Saldana MD [facility] [phone] [id number] Upcoming Health Maintenance Date Due Hepatitis C Screening 1955 Pneumococcal 19-64 Medium Risk (1 of 1 - PPSV23) 7/24/1972 FOOT EXAM Q1 4/28/2015 MICROALBUMIN Q1 11/24/2015 FOBT Q 1 YEAR AGE 50-75 11/24/2015 LIPID PANEL Q1 3/4/2016 INFLUENZA AGE 9 TO ADULT 8/1/2016 HEMOGLOBIN A1C Q6M 11/10/2016 EYE EXAM RETINAL OR DILATED Q1 8/31/2017 DTaP/Tdap/Td series (2 - Td) 11/24/2024 Allergies as of 3/27/2017  Review Complete On: 3/27/2017 By: Roseann Saldana MD  
 No Known Allergies Current Immunizations  Reviewed on 7/20/2016 Name Date PPD 11/13/2012 TB Skin Test (PPD) Intradermal 2/25/2014 Tdap 11/24/2014 Zoster Vaccine, Live 12/18/2014 Not reviewed this visit You Were Diagnosed With   
  
 Codes Comments Cough    -  Primary ICD-10-CM: B46 ICD-9-CM: 786.2 Epistaxis     ICD-10-CM: R04.0 ICD-9-CM: 763. 7 Dysphagia, unspecified type     ICD-10-CM: R13.10 ICD-9-CM: 787.20 Vitals BP Pulse Temp Resp Height(growth percentile) 123/85 (BP 1 Location: Left arm, BP Patient Position: Sitting) 71 98.2 °F (36.8 °C) (Axillary) 18 5' 7\" (1.702 m) Weight(growth percentile) SpO2 BMI Smoking Status 184 lb (83.5 kg) 98% 28.82 kg/m2 Never Smoker Vitals History BMI and BSA Data Body Mass Index Body Surface Area  
 28.82 kg/m 2 1.99 m 2 Preferred Pharmacy Pharmacy Name Phone [facility] [phone] Your Updated Medication List  
  
   
This list is accurate as of: 3/27/17  3:24 PM.  Always use your most recent med list.  
  
  
  
  
 acetaminophen 325 mg tablet Commonly known as:  TYLENOL  
 TAKE (1-2) TABLET BY MOUTH EVERY FOUR HOURS AS NEEDED for pain/fever  
  
 aspirin delayed-release 81 mg tablet TAKE 1 TABLET BY MOUTH DAILY  
  
 azithromycin 250 mg tablet Commonly known as:  Nam Purcell Take 2 tablets today, then take 1 tablet daily  
  
 benzonatate 200 mg capsule Commonly known as:  TESSALON Take 200 mg by mouth three (3) times daily as needed for Cough. benztropine 1 mg tablet Commonly known as:  COGENTIN Take 1 Tab by mouth nightly. Please cancel prior Rx  
  
 bisacodyl 5 mg EC tablet Commonly known as:  DULCOLAX Take 1 Tab by mouth daily as needed for Constipation. eslicarbazepine 317 mg Tab Commonly known as:  APTIOM Take 1 Tab by mouth daily. Can be crushed. Fiber Powd Generic drug:  psyllium seed-dextrose DISSOLVE 1 TEASPOONFUL IN 8 OUNCES OF BEVERAGE & DRINK ONCE DAILY IN THE EVENING  
  
 FIBER SMOOTH Powd Generic drug:  psyllium DISSOLVE 1 TEASPOONFUL IN 8 OUNCES OF BEVERAGE & DRINK ONCE DAILY IN THE EVENING  
  
 levETIRAcetam 1,000 mg tablet Commonly known as:  KEPPRA TAKE 1.5 tablets by mouth in the morning and 1.5 tablets by mouth at night LORazepam 0.5 mg tablet Commonly known as:  ATIVAN Take 1 Tab by mouth every eight (8) hours as needed for Anxiety. LOTRIMIN AF 2 % Spra Generic drug:  miconazole nitrate  
by Apply Externally route. mupirocin 2 % ointment Commonly known as:  [facility] Apply  to affected area daily. NATURAL FIBER LAXATIVE THERAPY Powd Generic drug:  psyllium seed-sucrose DISSOLVE 1 TEASPOONFUL IN 8 OUNCES OF BEVERAGE & DRINK ONCE DAILY IN THE EVENING  
  
 NORVASC 10 mg tablet Generic drug:  amLODIPine Take  by mouth daily. * OTHER Wheelchair, dx: F79, I69.359, R56.9  
  
 * OTHER Pt needs bed railing PriLOSEC 20 mg capsule Generic drug:  omeprazole Take 20 mg by mouth daily. risperiDONE 1 mg tablet Commonly known as:  RisperDAL Take 0.5 Tabs by mouth two (2) times a day. ROBAFEN 100 mg/5 mL liquid Generic drug:  guaiFENesin TAKE 10 mL BY MOUTH THREE TIMES DAILY AS NEEDED FOR CONGESTION & COUGH  
  
 ROBAFEN CF 30- mg/5 mL solution Generic drug:  PSEUDOEPHEDRINE-dextromethorphan-guaiFENesin Take 10 mL by mouth Before breakfast, lunch, and dinner. starch (thickening) Powd Commonly known as:  THICK-IT Please thicken all liquids to nectar consistency. [facility] Pt needs a new wheelchair current one in disrepair R26.2 * Notice: This list has 2 medication(s) that are the same as other medications prescribed for you. Read the directions carefully, and ask your doctor or other care provider to review them with you. Prescriptions Sent to Pharmacy Refills  
 starch, thickening, (THICK-IT) powd 2 Sig: Please thicken all liquids to nectar consistency. Class: Normal  
 Pharmacy: [address] [place] [address] Ph #: [phone]  
 azithromycin (ZITHROMAX) 250 mg tablet 0 Sig: Take 2 tablets today, then take 1 tablet daily Class: Normal  
 Pharmacy: [address] [place] [address] Ph #: [phone] [facility]! Dear Jaswinder Colindres: Thank you for requesting a [employer] account. Our records indicate that you have previously registered for a [employer] account but its currently inactive. Please call our [employer] support line at [phone]. Additional Information If you have questions, please visit the Frequently Asked Questions section of the [employer] website at [email or web address] Remember, [employer] is NOT to be used for urgent needs. For medical emergencies, dial 911. Now available from your iPhone and Android! Please provide this summary of care documentation to your next provider. Your primary care clinician is listed as Cheyanne Shirley. If you have any questions after today's visit, please call [phone].

## 2017-03-27 NOTE — PROGRESS NOTES
Pt here with group home counselor to follow up on epistaxis. Counselor states that pt has not had epistaxis since Thursday. Reports that pt has had had a linger cough x several weeks. States that pt has completed [facility], but no improvement.

## 2017-04-05 NOTE — TELEPHONE ENCOUNTER
Spoke to [facility] and informed her of new orders from Dr Bethany Jefferson. Requested we fax lab orders to lab when they arrive. She will have lab call and give us fax number. Lab results faxed to PCP.

## 2017-04-07 NOTE — TELEPHONE ENCOUNTER
Call returned and message left per saw Heredia to have labs done while at [facility]. and send results to our office. Call office back with other concerns.

## 2017-04-07 NOTE — TELEPHONE ENCOUNTER
----- Message from [facility] sent at 4/7/2017  1:47 PM EDT -----  Regarding: Dr. Eliza Peterson pt's care taker stated that pt was admitted to [facility] on 04/05/2017. Pt is having blood work repeated on 04/10/2017. Manager of pt group home requesting if Rossy Green could do the blood work and send over the order.  Best contact [phone]

## 2017-05-02 NOTE — PROGRESS NOTES
[facility] medical assistance request was put on Dr Eaton's desk to process

## 2017-05-31 ENCOUNTER — DOCUMENTATION ONLY (OUTPATIENT)
Dept: FAMILY MEDICINE CLINIC | Age: 62
End: 2017-05-31

## 2017-06-06 ENCOUNTER — DOCUMENTATION ONLY (OUTPATIENT)
Dept: FAMILY MEDICINE CLINIC | Age: 62
End: 2017-06-06

## 2017-06-27 ENCOUNTER — DOCUMENTATION ONLY (OUTPATIENT)
Dept: FAMILY MEDICINE CLINIC | Age: 62
End: 2017-06-27

## 2017-06-27 NOTE — PROGRESS NOTES
Rec'd medical records request from [address] [place] [address], faxed request to [employer] for processing on 06/27/17, confirmation rec'd.

## 2024-06-11 NOTE — PATIENT INSTRUCTIONS
[facility]   Statement to Patients  April 1, 2014      In an effort to ensure the large volume of patient prescription refills is processed in the most efficient and expeditious manner, we are asking our patients to assist us by calling your Pharmacy for all prescription refills, this will include also your  Mail Order Pharmacy. The pharmacy will contact our office electronically to continue the refill process. Please do not wait until the last minute to call your pharmacy. We need at least 48 hours (2days) to fill prescriptions. We also encourage you to call your pharmacy before going to  your prescription to make sure it is ready. With regard to controlled substance prescription refill requests (narcotic refills) that need to be picked up at our office, we ask your cooperation by providing us with at least 72 hours (3days) notice that you will need a refill. We will not refill narcotic prescription refill requests after 4:00pm on any weekday, Monday through Thursday, or after 2:00pm on Fridays, or on the weekends. We encourage everyone to explore another way of getting your prescription refill request processed using Y[employer] De, our patient web portal through our electronic medical record system. Yi De is an efficient and effective way to communicate your medication request directly to the office and  downloadable as an jelani on your smart phone . Y[employer] De also features a review functionality that allows you to view your medication list as well as leave messages for your physician. Are you ready to get connected? If so please review the attatched instructions or speak to any of our staff to get you set up right away! Thank you so much for your cooperation. Should you have any questions please contact our Practice Administrator.     The Physicians and Staff,  [facility]       Thank you for choosing [facility] for your     care. You may receive a survey about your visit. We appreciate you taking time     to complete this survey as we use your feedback to improve our services. We     realize we are not perfect, but we strive to provide excellent care. Learning About Living [place]  What is a living will? A living will is a legal form you use to write down the kind of care you want at the end of your life. It is used by the health professionals who will treat you if you aren't able to decide for yourself. If you put your wishes in writing, your loved ones and others will know what kind of care you want. They won't need to guess. This can ease your mind and be helpful to others. A living will is not the same as an estate or property will. An estate will explains what you want to happen with your money and property after you die. Is a living will a legal document? A living will is a legal document. Each state has its own laws about living patten. If you move to another state, make sure that your living will is legal in the state where you now live. Or you might use a universal form that has been approved by many states. This kind of form can sometimes be completed and stored online. Your electronic copy will then be available wherever you have a connection to the Internet. In most cases, doctors will respect your wishes even if you have a form from a different state. · You don't need an  to complete a living will. But legal advice can be helpful if your state's laws are unclear, your health history is complicated, or your family can't agree on what should be in your living will. · You can change your living will at any time. Some people find that their wishes about end-of-life care change as their health changes. · In addition to making a living will, think about completing a medical power of  form.  This form lets you name the person you want to make end-of-life treatment decisions for you (your \"health care agent\") if you're not able to. Many hospitals and nursing homes will give you the forms you need to complete a living will and a medical power of . · Your living will is used only if you can't make or communicate decisions for yourself anymore. If you become able to make decisions again, you can accept or refuse any treatment, no matter what you wrote in your living will. · Your state may offer an online registry. This is a place where you can store your living will online so the doctors and nurses who need to treat you can find it right away. What should you think about when creating a living will? Talk about your end-of-life wishes with your family members and your doctor. Let them know what you want. That way the people making decisions for you won't be surprised by your choices. Think about these questions as you make your living will:  · Do you know enough about life support methods that might be used? If not, talk to your doctor so you know what might be done if you can't breathe on your own, your heart stops, or you're unable to swallow. · What things would you still want to be able to do after you receive life-support methods? Would you want to be able to walk? To speak? To eat on your own? To live without the help of machines? · If you have a choice, where do you want to be cared for? In your home? At a hospital or nursing home? · Do you want certain Rastafari practices performed if you become very ill? · If you have a choice at the end of your life, where would you prefer to die? At home? In a hospital or nursing home? Somewhere else? · Would you prefer to be buried or cremated? · Do you want your organs to be donated after you die? What should you do with your living will? · Make sure that your family members and your health care agent have copies of your living will. · Give your doctor a copy of your living will to keep in your medical record.  If you have more than one doctor, make sure that each one has a copy. · You may want to put a copy of your living will where it can be easily found. Where can you learn more? Go to http://daniel-annita.info/. Enter I966 in the search box to learn more about \"Learning About Living [place]. \"  Current as of: February 24, 2016  Content Version: 11.1  © 2725-1624 [employer]. Care instructions adapted under license by [employer] (which disclaims liability or warranty for this information). If you have questions about a medical condition or this instruction, always ask your healthcare professional. [address] any warranty or liability for your use of this information. After finishing 400 mg Aptiom     Start 600 mg Aptiom daily X 2 weeks     Then start 800 mg daily Aptiom and stay at this dose. Observation and assessment/Rehabilitation services/Teaching and training